# Patient Record
Sex: MALE | Race: WHITE
[De-identification: names, ages, dates, MRNs, and addresses within clinical notes are randomized per-mention and may not be internally consistent; named-entity substitution may affect disease eponyms.]

---

## 2020-10-22 ENCOUNTER — HOSPITAL ENCOUNTER (OUTPATIENT)
Dept: HOSPITAL 47 - LABPAT | Age: 50
Discharge: HOME | End: 2020-10-22
Attending: SURGERY
Payer: COMMERCIAL

## 2020-10-22 DIAGNOSIS — Z01.818: Primary | ICD-10-CM

## 2020-10-22 DIAGNOSIS — U07.1: ICD-10-CM

## 2020-10-29 ENCOUNTER — HOSPITAL ENCOUNTER (OUTPATIENT)
Dept: HOSPITAL 47 - ORWHC2ENDO | Age: 50
Discharge: HOME | End: 2020-10-29
Attending: SURGERY
Payer: COMMERCIAL

## 2020-10-29 VITALS — BODY MASS INDEX: 23.7 KG/M2

## 2020-10-29 VITALS — TEMPERATURE: 97.7 F

## 2020-10-29 VITALS — DIASTOLIC BLOOD PRESSURE: 71 MMHG | SYSTOLIC BLOOD PRESSURE: 106 MMHG

## 2020-10-29 VITALS — HEART RATE: 52 BPM | RESPIRATION RATE: 16 BRPM

## 2020-10-29 DIAGNOSIS — Z80.9: ICD-10-CM

## 2020-10-29 DIAGNOSIS — Z12.11: Primary | ICD-10-CM

## 2020-10-29 DIAGNOSIS — Z79.1: ICD-10-CM

## 2020-10-29 DIAGNOSIS — Z88.0: ICD-10-CM

## 2020-10-29 DIAGNOSIS — F17.210: ICD-10-CM

## 2020-10-29 DIAGNOSIS — F32.9: ICD-10-CM

## 2020-10-29 DIAGNOSIS — M19.90: ICD-10-CM

## 2020-10-29 DIAGNOSIS — D12.8: ICD-10-CM

## 2020-10-29 PROCEDURE — 88305 TISSUE EXAM BY PATHOLOGIST: CPT

## 2020-10-29 PROCEDURE — 45385 COLONOSCOPY W/LESION REMOVAL: CPT

## 2020-10-29 NOTE — P.OP
Date of Procedure: 10/29/20


Preoperative Diagnosis: 


screening


Postoperative Diagnosis: 


same





Procedure(s) Performed: 


Colonoscopy with hot snare polypectomy


Anesthesia: JASPER


Surgeon: Alex Warren


Estimated Blood Loss (ml): 0


Condition: stable


Disposition: observation


Description of Procedure: 


Patient is brought operative suite placed in the left lateral decubitus position

underwent sedation per department of anesthesia rectal exam was performed 

correct patient correct procedure correct site was verified.  Scope was passed 

from the rectum to the cecum with the slowly withdrawn the sure to visualize all

walls of the colon on the way out there was 3 polyps noted 2 large pedunculated 

polyps one at 10 cm in the rectum and one at 15 cm in the rectum these were both

removed via hot snare polypectomy and sent to pathology.  There was also a small

sessile polyp noted at 10 cm in the rectum this was also snared with a hot snare

and removed and sent to pathology.  Scope was retroflexed and no gross 

abnormalities were noted in the rectum scope was then withdrawn patient artery 

procedure well no apparent complications

## 2021-12-05 ENCOUNTER — HOSPITAL ENCOUNTER (OUTPATIENT)
Dept: HOSPITAL 47 - EC | Age: 51
Setting detail: OBSERVATION
Discharge: HOME | End: 2021-12-05
Attending: INTERNAL MEDICINE | Admitting: INTERNAL MEDICINE
Payer: COMMERCIAL

## 2021-12-05 VITALS — HEART RATE: 88 BPM | TEMPERATURE: 98.1 F | DIASTOLIC BLOOD PRESSURE: 68 MMHG | SYSTOLIC BLOOD PRESSURE: 108 MMHG

## 2021-12-05 VITALS — RESPIRATION RATE: 18 BRPM

## 2021-12-05 DIAGNOSIS — M65.9: ICD-10-CM

## 2021-12-05 DIAGNOSIS — Z88.0: ICD-10-CM

## 2021-12-05 DIAGNOSIS — F17.210: ICD-10-CM

## 2021-12-05 DIAGNOSIS — L03.012: ICD-10-CM

## 2021-12-05 DIAGNOSIS — Z80.7: ICD-10-CM

## 2021-12-05 DIAGNOSIS — A41.9: Primary | ICD-10-CM

## 2021-12-05 DIAGNOSIS — L02.512: ICD-10-CM

## 2021-12-05 DIAGNOSIS — M54.9: ICD-10-CM

## 2021-12-05 DIAGNOSIS — Z20.822: ICD-10-CM

## 2021-12-05 LAB
ALBUMIN SERPL-MCNC: 3.7 G/DL (ref 3.5–5)
ALP SERPL-CCNC: 75 U/L (ref 38–126)
ALT SERPL-CCNC: 16 U/L (ref 4–49)
ANION GAP SERPL CALC-SCNC: 9 MMOL/L
APTT BLD: 24.7 SEC (ref 22–30)
AST SERPL-CCNC: 26 U/L (ref 17–59)
BASOPHILS # BLD AUTO: 0 K/UL (ref 0–0.2)
BASOPHILS NFR BLD AUTO: 0 %
BUN SERPL-SCNC: 14 MG/DL (ref 9–20)
CALCIUM SPEC-MCNC: 8.8 MG/DL (ref 8.4–10.2)
CHLORIDE SERPL-SCNC: 102 MMOL/L (ref 98–107)
CO2 SERPL-SCNC: 24 MMOL/L (ref 22–30)
EOSINOPHIL # BLD AUTO: 0.1 K/UL (ref 0–0.7)
EOSINOPHIL NFR BLD AUTO: 1 %
ERYTHROCYTE [DISTWIDTH] IN BLOOD BY AUTOMATED COUNT: 4.1 M/UL (ref 4.3–5.9)
ERYTHROCYTE [DISTWIDTH] IN BLOOD: 13.9 % (ref 11.5–15.5)
GLUCOSE SERPL-MCNC: 169 MG/DL (ref 74–99)
HCT VFR BLD AUTO: 36.4 % (ref 39–53)
HGB BLD-MCNC: 12.5 GM/DL (ref 13–17.5)
INR PPP: 0.9 (ref ?–1.2)
LYMPHOCYTES # SPEC AUTO: 1.2 K/UL (ref 1–4.8)
LYMPHOCYTES NFR SPEC AUTO: 9 %
MAGNESIUM SPEC-SCNC: 2.4 MG/DL (ref 1.6–2.3)
MCH RBC QN AUTO: 30.4 PG (ref 25–35)
MCHC RBC AUTO-ENTMCNC: 34.2 G/DL (ref 31–37)
MCV RBC AUTO: 88.9 FL (ref 80–100)
MONOCYTES # BLD AUTO: 0.7 K/UL (ref 0–1)
MONOCYTES NFR BLD AUTO: 6 %
NEUTROPHILS # BLD AUTO: 10.8 K/UL (ref 1.3–7.7)
NEUTROPHILS NFR BLD AUTO: 84 %
PLATELET # BLD AUTO: 411 K/UL (ref 150–450)
POTASSIUM SERPL-SCNC: 4.2 MMOL/L (ref 3.5–5.1)
PROT SERPL-MCNC: 6.7 G/DL (ref 6.3–8.2)
PT BLD: 9.5 SEC (ref 9–12)
SODIUM SERPL-SCNC: 135 MMOL/L (ref 137–145)
WBC # BLD AUTO: 12.9 K/UL (ref 3.8–10.6)

## 2021-12-05 PROCEDURE — 87205 SMEAR GRAM STAIN: CPT

## 2021-12-05 PROCEDURE — 83605 ASSAY OF LACTIC ACID: CPT

## 2021-12-05 PROCEDURE — 11042 DBRDMT SUBQ TIS 1ST 20SQCM/<: CPT

## 2021-12-05 PROCEDURE — 83036 HEMOGLOBIN GLYCOSYLATED A1C: CPT

## 2021-12-05 PROCEDURE — 26055 INCISE FINGER TENDON SHEATH: CPT

## 2021-12-05 PROCEDURE — 83735 ASSAY OF MAGNESIUM: CPT

## 2021-12-05 PROCEDURE — 85730 THROMBOPLASTIN TIME PARTIAL: CPT

## 2021-12-05 PROCEDURE — 87077 CULTURE AEROBIC IDENTIFY: CPT

## 2021-12-05 PROCEDURE — 73130 X-RAY EXAM OF HAND: CPT

## 2021-12-05 PROCEDURE — 87070 CULTURE OTHR SPECIMN AEROBIC: CPT

## 2021-12-05 PROCEDURE — 26020 DRAIN HAND TENDON SHEATH: CPT

## 2021-12-05 PROCEDURE — 87075 CULTR BACTERIA EXCEPT BLOOD: CPT

## 2021-12-05 PROCEDURE — 85610 PROTHROMBIN TIME: CPT

## 2021-12-05 PROCEDURE — 13132 CMPLX RPR F/C/C/M/N/AX/G/H/F: CPT

## 2021-12-05 PROCEDURE — 96375 TX/PRO/DX INJ NEW DRUG ADDON: CPT

## 2021-12-05 PROCEDURE — 13133 CMPLX RPR F/C/C/M/N/AX/G/H/F: CPT

## 2021-12-05 PROCEDURE — 85025 COMPLETE CBC W/AUTO DIFF WBC: CPT

## 2021-12-05 PROCEDURE — 87635 SARS-COV-2 COVID-19 AMP PRB: CPT

## 2021-12-05 PROCEDURE — 87040 BLOOD CULTURE FOR BACTERIA: CPT

## 2021-12-05 PROCEDURE — 80053 COMPREHEN METABOLIC PANEL: CPT

## 2021-12-05 PROCEDURE — 99285 EMERGENCY DEPT VISIT HI MDM: CPT

## 2021-12-05 PROCEDURE — 84100 ASSAY OF PHOSPHORUS: CPT

## 2021-12-05 PROCEDURE — 87186 SC STD MICRODIL/AGAR DIL: CPT

## 2021-12-05 PROCEDURE — 96374 THER/PROPH/DIAG INJ IV PUSH: CPT

## 2021-12-05 NOTE — P.PN
Progress Note - Text


Progress Note Date: 12/05/21





Brief Post op:





Pt s/e post procedure 1430. No issues.  Still sleepy.  Denies pain.  Cap refill 

brisk.  Compartments soft.  ID consulted, IVABX on board.  Start hibiclens soaks

BID tomrrow.  Will follow.

## 2021-12-05 NOTE — P.CNOR
History of Present Illness





- HPI


Consult date: 12/05/21


Consult reason: other (infection)


History of present illness: 





50 yo male presented with c/o L hand pain with draining wound.  Pt states it has

been like this for about a week but over the past three days has gotten worse.  

He states pain in his ring finger with open abscess over the dorsal aspect as 

well as pain on the palmar side, pain with any motion and pain with palpation.  

He states swelling as well.  Denies any f/c/sob/cp at this time.  States no 

significant medical history.  He does smoke.  





Review of Systems





14 tp ROS completed and negative as per HPI.  All other systems reviewed are 

negative. 





Past Medical History


Past Medical History: No Reported History


Additional Past Medical History / Comment(s): back pain


History of Any Multi-Drug Resistant Organisms: None Reported


Past Surgical History: No Surgical Hx Reported


Additional Past Surgical History / Comment(s): wisdom teeth


Past Anesthesia/Blood Transfusion Reactions: No Reported Reaction


Past Psychological History: No Psychological Hx Reported


Smoking Status: Current every day smoker


Past Alcohol Use History: None Reported


Past Drug Use History: None Reported





- Past Family History


  ** Mother


Family Medical History: Cancer





Medications and Allergies


                                Home Medications











 Medication  Instructions  Recorded  Confirmed  Type


 


No Known Home Medications  12/05/21 12/05/21 History








                                    Allergies











Allergy/AdvReac Type Severity Reaction Status Date / Time


 


Penicillins Allergy  Unknown Verified 12/05/21 07:36





   Childhood  














Physical Examination


Osteopathic Statement: *.  No significant issues noted on an osteopathic 

structural exam other than those noted in the History and Physical/Consult.





AOX3 NAD





Lt hand:


Fusiform swelling about the Left hand and mostly ring finger.  There is erythema

and open abscess just proximal to the PIP jont of the Lt ring finger.  There is 

ttp of the extensor region as well as flexor region.  There is erythema.  There 

is pain with passive motion of the digit.  There is skin sloughing over the 

dorsum of the finger due to blistering from the swelling.  Pt is intact to 

pinprick and light touch M/R/U nerves at this time and is hypersensitive in this

RF.  He can move the finger with good motor strength but this greatly increases 

his pain.  +rad/ulnar pulses.  Compartments otherwise are soft.  





Results





xray of the Lt hand show swelling about the soft tissues.  No fracture or 

dislocation is noted.  





- Labs


Labs: 


                  Abnormal Lab Results - Last 24 Hours (Table)











  12/05/21 12/05/21 Range/Units





  06:57 06:57 


 


WBC  12.9 H   (3.8-10.6)  k/uL


 


RBC  4.10 L   (4.30-5.90)  m/uL


 


Hgb  12.5 L   (13.0-17.5)  gm/dL


 


Hct  36.4 L   (39.0-53.0)  %


 


Neutrophils #  10.8 H   (1.3-7.7)  k/uL


 


Sodium   135 L  (137-145)  mmol/L


 


Glucose   169 H  (74-99)  mg/dL


 


Magnesium   2.4 H  (1.6-2.3)  mg/dL








                                      H & H











  12/05/21 Range/Units





  06:57 


 


Hgb  12.5 L  (13.0-17.5)  gm/dL


 


Hct  36.4 L  (39.0-53.0)  %








                                   Coagulation











  12/05/21 Range/Units





  06:57 


 


INR  0.9  (<1.2)  











Result Diagrams: 


                                 12/05/21 06:57





                                 12/05/21 06:57





Assessment and Plan


Assessment: 





50 yo male with Lt ring finger flexor tenosynovitis with concurrent dorsal ring 

finger abscess


Plan: 





NPO


Consent confirmed


Plan for OR for I&D of Lt ring finger urgently 








I discussed the risks and benefits of surgery with the pt.  He understands and 

is willing to proceed with I&D of the Lt hand.

## 2021-12-05 NOTE — XR
EXAMINATION TYPE: XR hand complete LT

 

DATE OF EXAM: 12/5/2021

 

COMPARISON: NONE

 

HISTORY: Hand swelling

 

TECHNIQUE: 3 views

 

FINDINGS: Metacarpals are intact. The fingers appear intact. I see no fracture nor dislocation. Carpa
l bones appear normal. There is soft tissue swelling on the dorsum of the hand.

 

IMPRESSION: Soft tissue swelling. No fracture seen. No sign of inflammatory arthritis.

## 2021-12-05 NOTE — ED
Skin/Abscess/FB HPI





- General


Chief complaint: Skin/Abscess/Foreign Body


Stated complaint: Left hand swelling


Time Seen by Provider: 12/05/21 05:22


Source: patient, RN notes reviewed, old records reviewed


Mode of arrival: ambulatory


Limitations: no limitations





- History of Present Illness


Initial comments: 





This is a 51-year-old male DF for evaluation today.  Patient comes in DF for 

evaluation regarding significant swelling and pain left finger and hand.  

Swelling up arm.  Patient does feel fevers at times.  Denying drug or alcohol 

abuse denying significant medical problems denying any other complaints.


MD complaint: abscess/boil, discoloration, other (Severe left 4 finger, ring 

finger pain swelling and drainage)


-: week(s)


Tetanus Up to Date: no


Location: L hand


Severity: severe


Severity scale (1-10): 10


Quality: burning, aching, sharp, constant


Consistency: constant


Improves with: none


Worsens with: palpation, movement


Context: other (Thinks he may have been bit by something)


Associated symptoms: denies other symptoms


Treatments Prior to Arrival: attempted to drain pus at home





- Related Data


                                Home Medications











 Medication  Instructions  Recorded  Confirmed


 


Lexapro(Unknown) 1 tab PO DAILY 12/06/21 12/06/21








                                  Previous Rx's











 Medication  Instructions  Recorded


 


Ibuprofen [Motrin] 400 mg PO Q6HR PRN  tab 12/08/21


 


Linezolid [Zyvox] 600 mg PO Q12H #28 tab 12/08/21


 


Nicotine 21Mg/24Hr Patch [Habitrol] 1 patch TRANSDERM DAILY #30 patch 12/08/21


 


Pantoprazole [Protonix] 40 mg PO AC-BRKFST #30 tab 12/08/21











                                    Allergies











Allergy/AdvReac Type Severity Reaction Status Date / Time


 


Penicillins Allergy  Unknown Verified 12/06/21 06:48





   Childhood  














Review of Systems


ROS Statement: 


Those systems with pertinent positive or pertinent negative responses have been 

documented in the HPI.





ROS Other: All systems not noted in ROS Statement are negative.





Past Medical History


Past Medical History: No Reported History


Additional Past Medical History / Comment(s): back pain


History of Any Multi-Drug Resistant Organisms: None Reported


Past Surgical History: No Surgical Hx Reported


Additional Past Surgical History / Comment(s): wisdom teeth


Past Anesthesia/Blood Transfusion Reactions: No Reported Reaction


Past Psychological History: No Psychological Hx Reported


Smoking Status: Current every day smoker


Past Alcohol Use History: None Reported


Past Drug Use History: None Reported





- Past Family History


  ** Mother


Family Medical History: Cancer





General Exam


Limitations: no limitations


General appearance: alert, in no apparent distress, anxious


Head exam: Present: atraumatic, normocephalic, normal inspection


Eye exam: Present: normal appearance, PERRL, EOMI.  Absent: scleral icterus, 

conjunctival injection, periorbital swelling


ENT exam: Present: normal exam, mucous membranes moist


Neck exam: Present: normal inspection.  Absent: tenderness, meningismus, 

lymphadenopathy


Respiratory exam: Present: normal lung sounds bilaterally.  Absent: respiratory 

distress, wheezes, rales, rhonchi, stridor


Cardiovascular Exam: Present: normal rhythm, tachycardia, normal heart sounds.  

Absent: systolic murmur, diastolic murmur, rubs, gallop, clicks


GI/Abdominal exam: Present: soft, normal bowel sounds.  Absent: distended, 

tenderness, guarding, rebound, rigid


Extremities exam: Present: normal inspection, full ROM, normal capillary refill,

 other (left Ring finger does have significant swelling and erythema and 

drainage).  Absent: tenderness, pedal edema, joint swelling, calf tenderness


Back exam: Present: normal inspection


Neurological exam: Present: alert, oriented X3, CN II-XII intact


Psychiatric exam: Present: normal affect, normal mood


Skin exam: Present: warm, dry, intact, normal color.  Absent: rash





Course


                                   Vital Signs











  12/05/21





  05:04


 


Temperature 99.3 F


 


Pulse Rate 110 H


 


Respiratory 24





Rate 


 


Blood Pressure 103/58


 


O2 Sat by Pulse 96





Oximetry 














- Reevaluation(s)


Reevaluation #1: 





12/05/21 06:12


Medical record is reviewed


Reevaluation #2: 





12/05/21 06:12


Pain is controlled


Reevaluation #3: 





12/05/21 06:12


Patient informed results and questions answered





Patient will require orthopedic evaluation








- Consultations


Consultation #1: 





Spoke with committing doctor who agrees to admit this patient





Medical Decision Making





- Medical Decision Making





51 male to the emergency department with likely flexor tenosynovitis.  Patient 

has severe pain swelling increasing upper arm will admit for IV antibiotics and 

surgical evaluation





- Lab Data


Result diagrams: 


                                 12/05/21 06:57





                                 12/05/21 06:57





- Radiology Data


Radiology results: report reviewed (History of significant finger swelling), 

image reviewed





Critical Care Time


Critical Care Time: Yes


Total Critical Care Time: 31





Disposition


Clinical Impression: 


 Abscess of left ring finger, Cellulitis of left ring finger





Narrative: 





r/o Flexor Tenosynovitis


Disposition: ADMITTED AS IP TO THIS HOSP


Condition: Serious


Is patient prescribed a controlled substance at d/c from ED?: No

## 2021-12-05 NOTE — P.HPIM
History of Present Illness


H&P Date: 21





HISTORY OF PRESENT ILLNESS


This is a 51-year-old male patient of [facility] with past medical history 

of back to use and dependence.  Patient presented to the hospital with a 

draining wound on the left fourth finger.  He is a very poor historian, he is 

falling asleep while asking questions and does not answer questions completely. 

He is complaining of left hand pain.  He states it has been bothering him for 3 

days.  Unknown if he has had any fever or chills.  Unable to obtain 

injury/source of wound.  Patient was found to have a temperature of 99.3, heart 

rate 110, respiratory rate 24, blood pressure 103/58, pulse ox 96% on room air.


WBC 12.9, hemoglobin 12.5, platelet count 411.  INR 0.9.  Sodium 135 otherwise 

electrolytes and renal function normal.  Blood sugar 169.  Patient does deny 

history of diabetes.  Magnesium 2.4.  Liver function tests normal.  Albumin 3.7.

 Corona virus PCR not detected.


Left hand x-ray reveals soft tissue swelling.  No fracture.  No sign of 

inflammatory arthritis.


Patient is seen today in the emergency center waiting for a bed on the [facility] 

floor.  Patient has been seen by orthopedics with plan for I&D today





REVIEW OF SYSTEMS


Constitutional: No fever, no chills, no night sweats.  No weight change.  No 

weakness, fatigue or lethargy.  No daytime sleepiness.


EENT: No headache.  No blurred vision or double vision, no loss of vision.  No 

loss of Hearing, no ringing in the ears, no dizziness.  No nasal drainage or 

congestion.  No epistaxis.  No sore throat.


Lungs: No shortness of breath, cough, no sputum production.  No wheezing.


Cardiovascular: No chest pain, no lower extremity edema.  No palpitations.  No 

paroxysmal nocturnal dyspnea.  No orthopnea.  No lightheadedness or dizziness.  

No syncopal episodes.


Abdominal: No abdominal pain.  No nausea, vomiting.  No diarrhea.  No 

constipation.  No bloody or tarry stools.  No loss of appetite.


Genitourinary: No dysuria, increased frequency, urgency.  No urinary retention.


Musculoskeletal: No myalgias.  No muscle weakness, no gait dysfunction, no 

frequent falls.  No back pain.  No neck pain.


Integumentary: No wounds, no lesions.  No rash or pruritus.  No unusual 

bruising.  No change in hair or nails.


Neurologic: No aphasia. No facial droop. No change in mentation. No head injury.

No headache. No paralysis. No paresthesia.


Psychiatric: No depression.  No anxiety.  No mood swings.


Endocrine: No abnormal blood sugars.  No weight change.  No excessive sweating 

or thirst.  No cold intolerance.  








SOCIAL HISTORY


Patient is a smoker one pack per day since he was 14 years of age.  He denies 

any alcohol use, street drug use or marijuana use.  He is single.  Patient is 

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





FAMILY HISTORY


Mother  at age 37 from lymphoma.  Father  from a accident at work.  

Patient has one brother and 3 sisters with no major medical problems.  Patient 

is 2 daughters with no major medical problems.





PHYSICAL EXAMINATION


Gen: This is a 51-year-old male, resting in the ER stretcher, patient is sleepy 

and groggy.


HEENT: Head is atraumatic, normocephalic. Pupils equal, round. Sclerae is 

anicteric. 


NECK: Supple. No JVD. No lymphadenopathy. No thyromegaly. 


LUNGS: Clear to auscultation. No wheezes or rhonchi.  No intercostal 

retractions.


HEART: Regular rate and rhythm. No murmur. 


ABDOMEN: Soft. Bowel sounds are present. No masses.  No tenderness.


EXTREMITIES: No pedal edema.  No calf tenderness.  Abscess with serous drainage 

from the left fourth finger, significant edema to the hand wrist and into the di

stal forearm.


NEUROLOGICAL: Patient is groggy but awakens to answer questions and falls 

asleep. Cranial nerves 2 through 12 are grossly intact. 





ASSESSMENT AND PLAN


1.  Sepsis secondary to abscess left fourth finger.  Patient admitted to the 

[facility] floor, consult with orthopedics with plan for I&D urgently.  Continue 

ceftriaxone and vancomycin, obtain wound culture and blood culture.  Consult 

added for Dr. soto.  Continue Dilaudid, ibuprofen for pain





2.  Tobacco use and dependence.  Patient will be started on nicotine patch.





3.  GI prophylaxis.  Protonix 40 mg oral daily.





4.  DVT prophylaxis.  Heparin subcu.





5. COVID-19 testing negative.  Patient has been hospitalized during a pandemic.





Patient will be admitted to the hospital for a minimum of 2 night stay.





DISCHARGE PLAN


Home possible need for IV antibiotics .





Impression and plan of care have been directed as dictated by the signing 

physician.  Madai Maldonado nurse practitioner acting as scribe for signing 

physician.





Past Medical History


Past Medical History: No Reported History


Additional Past Medical History / Comment(s): back pain


History of Any Multi-Drug Resistant Organisms: None Reported


Past Surgical History: No Surgical Hx Reported


Additional Past Surgical History / Comment(s): wisdom teeth


Past Anesthesia/Blood Transfusion Reactions: No Reported Reaction


Past Psychological History: No Psychological Hx Reported


Smoking Status: Current every day smoker


Past Alcohol Use History: None Reported


Past Drug Use History: None Reported





- Past Family History


  ** Mother


Family Medical History: Cancer





Medications and Allergies


                                Home Medications











 Medication  Instructions  Recorded  Confirmed  Type


 


No Known Home Medications  21 History








                                    Allergies











Allergy/AdvReac Type Severity Reaction Status Date / Time


 


Penicillins Allergy  Unknown Verified 21 07:36





   Childhood  














Physical Exam


Vitals: 


                                   Vital Signs











  Temp Pulse Resp BP Pulse Ox


 


 21 05:04  99.3 F  110 H  24  103/58  96








                                Intake and Output











 21





 22:59 06:59 14:59


 


Other:   


 


  Weight  77.111 kg 














Results


CBC & Chem 7: 


                                 21 06:57





                                 21 06:57


Labs: 


                  Abnormal Lab Results - Last 24 Hours (Table)











  21 Range/Units





  06:57 06:57 


 


WBC  12.9 H   (3.8-10.6)  k/uL


 


RBC  4.10 L   (4.30-5.90)  m/uL


 


Hgb  12.5 L   (13.0-17.5)  gm/dL


 


Hct  36.4 L   (39.0-53.0)  %


 


Neutrophils #  10.8 H   (1.3-7.7)  k/uL


 


Sodium   135 L  (137-145)  mmol/L


 


Glucose   169 H  (74-99)  mg/dL


 


Magnesium   2.4 H  (1.6-2.3)  mg/dL

## 2021-12-06 ENCOUNTER — HOSPITAL ENCOUNTER (OUTPATIENT)
Dept: HOSPITAL 47 - EC | Age: 51
Setting detail: OBSERVATION
LOS: 2 days | End: 2021-12-08
Attending: INTERNAL MEDICINE | Admitting: INTERNAL MEDICINE
Payer: COMMERCIAL

## 2021-12-06 DIAGNOSIS — A41.02: Primary | ICD-10-CM

## 2021-12-06 DIAGNOSIS — B95.62: ICD-10-CM

## 2021-12-06 DIAGNOSIS — M54.9: ICD-10-CM

## 2021-12-06 DIAGNOSIS — L02.512: ICD-10-CM

## 2021-12-06 DIAGNOSIS — Z20.822: ICD-10-CM

## 2021-12-06 DIAGNOSIS — L03.012: ICD-10-CM

## 2021-12-06 DIAGNOSIS — Z88.0: ICD-10-CM

## 2021-12-06 DIAGNOSIS — F17.210: ICD-10-CM

## 2021-12-06 DIAGNOSIS — M65.9: ICD-10-CM

## 2021-12-06 DIAGNOSIS — Z98.890: ICD-10-CM

## 2021-12-06 DIAGNOSIS — Z80.7: ICD-10-CM

## 2021-12-06 PROCEDURE — 85652 RBC SED RATE AUTOMATED: CPT

## 2021-12-06 PROCEDURE — 96375 TX/PRO/DX INJ NEW DRUG ADDON: CPT

## 2021-12-06 PROCEDURE — 96365 THER/PROPH/DIAG IV INF INIT: CPT

## 2021-12-06 PROCEDURE — 99285 EMERGENCY DEPT VISIT HI MDM: CPT

## 2021-12-06 PROCEDURE — 80053 COMPREHEN METABOLIC PANEL: CPT

## 2021-12-06 PROCEDURE — 96366 THER/PROPH/DIAG IV INF ADDON: CPT

## 2021-12-06 PROCEDURE — 96367 TX/PROPH/DG ADDL SEQ IV INF: CPT

## 2021-12-06 PROCEDURE — 99284 EMERGENCY DEPT VISIT MOD MDM: CPT

## 2021-12-06 PROCEDURE — 85025 COMPLETE CBC W/AUTO DIFF WBC: CPT

## 2021-12-06 PROCEDURE — 82565 ASSAY OF CREATININE: CPT

## 2021-12-06 PROCEDURE — 96376 TX/PRO/DX INJ SAME DRUG ADON: CPT

## 2021-12-06 PROCEDURE — 87635 SARS-COV-2 COVID-19 AMP PRB: CPT

## 2021-12-06 PROCEDURE — 80202 ASSAY OF VANCOMYCIN: CPT

## 2021-12-06 PROCEDURE — 86140 C-REACTIVE PROTEIN: CPT

## 2021-12-06 PROCEDURE — 84100 ASSAY OF PHOSPHORUS: CPT

## 2021-12-06 PROCEDURE — 83735 ASSAY OF MAGNESIUM: CPT

## 2021-12-06 PROCEDURE — 96361 HYDRATE IV INFUSION ADD-ON: CPT

## 2021-12-06 RX ADMIN — HYDROMORPHONE HYDROCHLORIDE PRN MG: 1 INJECTION, SOLUTION INTRAMUSCULAR; INTRAVENOUS; SUBCUTANEOUS at 14:30

## 2021-12-06 RX ADMIN — HYDROMORPHONE HYDROCHLORIDE PRN MG: 1 INJECTION, SOLUTION INTRAMUSCULAR; INTRAVENOUS; SUBCUTANEOUS at 10:31

## 2021-12-06 RX ADMIN — HYDROCODONE BITARTRATE AND ACETAMINOPHEN PRN EACH: 5; 325 TABLET ORAL at 20:24

## 2021-12-06 RX ADMIN — HEPARIN SODIUM SCH: 5000 INJECTION INTRAVENOUS; SUBCUTANEOUS at 20:27

## 2021-12-06 RX ADMIN — CEFAZOLIN SCH MLS/HR: 330 INJECTION, POWDER, FOR SOLUTION INTRAMUSCULAR; INTRAVENOUS at 02:23

## 2021-12-06 RX ADMIN — CEFAZOLIN SCH MLS/HR: 330 INJECTION, POWDER, FOR SOLUTION INTRAMUSCULAR; INTRAVENOUS at 15:13

## 2021-12-06 RX ADMIN — CEFAZOLIN SCH: 330 INJECTION, POWDER, FOR SOLUTION INTRAMUSCULAR; INTRAVENOUS at 10:35

## 2021-12-06 RX ADMIN — NICOTINE SCH: 21 PATCH, EXTENDED RELEASE TRANSDERMAL at 14:27

## 2021-12-06 RX ADMIN — HYDROMORPHONE HYDROCHLORIDE PRN MG: 1 INJECTION, SOLUTION INTRAMUSCULAR; INTRAVENOUS; SUBCUTANEOUS at 18:04

## 2021-12-06 NOTE — P.HPIM
History of Present Illness


H&P Date: 21





HISTORY OF PRESENT ILLNESS


This is a 51-year-old male patient of [facility] with past medical history 

of back to use and dependence.  Patient presented to the hospital with a 

draining wound on the left fourth finger.  Patient was initially admitted on 

. He was a very poor historian, and again will not give any details about 

the injury.  Patient was seen at that time by Dr. Gonzalez and underwent I&D 

of the left dorsal hand and I&D of the left ring finger flexor sheath, left ring

finger A1 pulley release for flexor tenosynovitis of the left ring finger.  

Ulcers were obtained during surgery.  [place] police department contacted the

hospital regarding need to take suspect and arrangements were made for him to be

quickly discharged.  Patient now returns due to ongoing infection and need for 

antibiotics.  Patient has significantly less swelling and redness to the left 

hand and finger, sutures in place to the left fourth digit.  He is seen today in

the emergency center waiting for a [facility] bed.  Consults in place for 

orthopedics and infectious disease.  At the time of discharge, please officers 

planning for patient being arrested and we are hoping that cultures will be back

by tomorrow.








REVIEW OF SYSTEMS


Constitutional: No fever, no chills, no night sweats.  No weight change.  No 

weakness, fatigue or lethargy.  No daytime sleepiness.


EENT: No headache.  No blurred vision or double vision, no loss of vision.  No 

loss of Hearing, no ringing in the ears, no dizziness.  No nasal drainage or 

congestion.  No epistaxis.  No sore throat.


Lungs: No shortness of breath, cough, no sputum production.  No wheezing.


Cardiovascular: No chest pain, no lower extremity edema.  No palpitations.  No 

paroxysmal nocturnal dyspnea.  No orthopnea.  No lightheadedness or dizziness.  

No syncopal episodes.


Abdominal: No abdominal pain.  No nausea, vomiting.  No diarrhea.  No 

constipation.  No bloody or tarry stools.  No loss of appetite.


Genitourinary: No dysuria, increased frequency, urgency.  No urinary retention.


Musculoskeletal: No myalgias.  No muscle weakness, no gait dysfunction, no 

frequent falls.  No back pain.  No neck pain.


Integumentary: Noted wounds Left 4th finger, no lesions.  No rash or pruritus.  

No unusual bruising.  No change in hair or nails.


Neurologic: No aphasia. No facial droop. No change in mentation. No head injury.

No headache. No paralysis. No paresthesia.


Psychiatric: No depression.  No anxiety.  No mood swings.


Endocrine: No abnormal blood sugars.  No weight change.  No excessive sweating 

or thirst.  No cold intolerance.  





SOCIAL HISTORY


Patient is a smoker one pack per day since he was 14 years of age.  He denies 

any alcohol use, street drug use or marijuana use.  He is single.  Patient is 

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





FAMILY HISTORY


Mother  at age 37 from lymphoma.  Father  from a accident at work.  

Patient has one brother and 3 sisters with no major medical problems.  Patient 

is 2 daughters with no major medical problems.





PHYSICAL EXAMINATION


Gen: This is a 51-year-old male, resting in the ER stretcher, patient is in no 

acute distress.   at bedside


HEENT: Head is atraumatic, normocephalic. Pupils equal, round. Sclerae is 

anicteric. 


NECK: Supple. No JVD. No lymphadenopathy. No thyromegaly. 


LUNGS: Clear to auscultation. No wheezes or rhonchi.  No intercostal 

retractions.


HEART: Regular rate and rhythm. No murmur. 


ABDOMEN: Soft. Bowel sounds are present. No masses.  No tenderness.


EXTREMITIES: No pedal edema.  No calf tenderness.  Dressing in place to the left

hand and forearm.  Sutures in place to the left fourth digit, edema and redness 

improved from yesterday.  to the hand wrist and into the distal forearm.


NEUROLOGICAL: Patient is awake and gives "I don't know" to all questions.  No 

neural deficits. Cranial nerves 2 through 12 are grossly intact. 





ASSESSMENT AND PLAN


1.  Sepsis secondary to tenosynovitis left fourth finger status post I&D.  

Patient admitted to the [facility] floor, consult with orthopedics and infectious 

disease.  Continue ceftriaxone and vancomycin, wound culture and blood culture 

in process.  Continue Dilaudid, ibuprofen for pain.  Obtain CRP and sed rate.  

Repeat blood work ordered for tomorrow





2.  Tobacco use and dependence.  Patient will be started on nicotine patch.





3.  GI prophylaxis.  Protonix 40 mg oral daily.





4.  DVT prophylaxis.  Heparin subcu.





5. COVID-19 testing negative.  Patient has been hospitalized during a pandemic.





Patient will be admitted to the hospital for a minimum of 2 night stay.





DISCHARGE PLAN


Return to half-way in the next 24 hours.





Impression and plan of care have been directed as dictated by the signing 

physician.  Madai Maldonado nurse practitioner acting as scribe for signing 

physician.





Past Medical History


Past Medical History: No Reported History


Additional Past Medical History / Comment(s): back pain , cellulitis


History of Any Multi-Drug Resistant Organisms: None Reported


Past Surgical History: No Surgical Hx Reported


Additional Past Surgical History / Comment(s): wisdom teeth, I&D


Past Anesthesia/Blood Transfusion Reactions: No Reported Reaction


Past Psychological History: No Psychological Hx Reported


Smoking Status: Current every day smoker


Past Alcohol Use History: None Reported


Past Drug Use History: None Reported





- Past Family History


  ** Mother


Family Medical History: Cancer





  ** Father


Family Medical History: No Reported History





Medications and Allergies


                                Home Medications











 Medication  Instructions  Recorded  Confirmed  Type


 


Lexapro(Unknown) 1 tab PO DAILY 21 History








                                    Allergies











Allergy/AdvReac Type Severity Reaction Status Date / Time


 


Penicillins Allergy  Unknown Verified 21 06:48





   Childhood  














Physical Exam


Vitals: 


                                   Vital Signs











  Temp Pulse Resp BP Pulse Ox


 


 21 01:34  98.6 F  68  16  102/65  99








                                Intake and Output











 21





 22:59 06:59 14:59


 


Other:   


 


  Weight  77.111 kg

## 2021-12-06 NOTE — ED
Recheck HPI





- General


Chief Complaint: Extremity Injury, Upper


Stated Complaint: Hand Infection


Time Seen by Provider: 12/06/21 01:33


Source: patient, police, RN notes reviewed, old records reviewed


Mode of arrival: wheelchair


Limitations: no limitations





- History of Present Illness


Initial Comments: 





This is a 51-year-old male to the ER today for evaluation.  Patient is brought 

in as a transfer from an outside hospital for evaluation regards to repeat 

evaluation regarding postop surgery and flexor tenosynovitis.  Patient's 

presenting for assisted clearance.


MD Complaint: wound re-check, needs IV antibiotics


-: days(s)


Returns Today for: Called Because of Abnormal Lab/Test, needs IV antibiotics, 

persistent/worsening pain related to initial visit


Symptoms Since Prior Visit: worsening pain, worsening swelling, worsening 

redness, fever


Associated Symptoms: none


Treatments Prior to Arrival: Given Antibiotics on, Given Pain Meds on





- Related Data


                                Home Medications











 Medication  Instructions  Recorded  Confirmed


 


Lexapro(Unknown) 1 tab PO DAILY 12/06/21 12/06/21








                                  Previous Rx's











 Medication  Instructions  Recorded


 


Ibuprofen [Motrin] 400 mg PO Q6HR PRN  tab 12/08/21


 


Linezolid [Zyvox] 600 mg PO Q12H #28 tab 12/08/21


 


Nicotine 21Mg/24Hr Patch [Habitrol] 1 patch TRANSDERM DAILY #30 patch 12/08/21


 


Pantoprazole [Protonix] 40 mg PO AC-BRKFST #30 tab 12/08/21











                                    Allergies











Allergy/AdvReac Type Severity Reaction Status Date / Time


 


Penicillins Allergy  Unknown Verified 12/06/21 06:48





   Childhood  














Review of Systems


ROS Statement: 


Those systems with pertinent positive or pertinent negative responses have been 

documented in the HPI.





ROS Other: All systems not noted in ROS Statement are negative.





Past Medical History


Past Medical History: No Reported History


Additional Past Medical History / Comment(s): back pain , cellulitis


History of Any Multi-Drug Resistant Organisms: None Reported


Past Surgical History: No Surgical Hx Reported


Additional Past Surgical History / Comment(s): wisdom teeth, I&D


Past Anesthesia/Blood Transfusion Reactions: No Reported Reaction


Past Psychological History: No Psychological Hx Reported


Smoking Status: Current every day smoker


Past Alcohol Use History: None Reported


Past Drug Use History: None Reported





- Past Family History


  ** Mother


Family Medical History: Cancer





  ** Father


Family Medical History: No Reported History





General Exam


Limitations: no limitations


General appearance: alert, in no apparent distress


Head exam: Present: atraumatic, normocephalic, normal inspection


Eye exam: Present: normal appearance, PERRL, EOMI.  Absent: scleral icterus, 

conjunctival injection, periorbital swelling


ENT exam: Present: normal exam, mucous membranes moist


Neck exam: Present: normal inspection.  Absent: tenderness, meningismus, 

lymphadenopathy


Respiratory exam: Present: normal lung sounds bilaterally.  Absent: respiratory 

distress, wheezes, rales, rhonchi, stridor


Cardiovascular Exam: Present: regular rate, normal rhythm, normal heart sounds. 

 Absent: systolic murmur, diastolic murmur, rubs, gallop, clicks


GI/Abdominal exam: Present: soft, normal bowel sounds.  Absent: distended, 

tenderness, guarding, rebound, rigid


Extremities exam: Present: normal inspection, full ROM, normal capillary refill,

 other (Finger still remains significantly swollen edematous streaking up arm). 

 Absent: tenderness, pedal edema, joint swelling, calf tenderness


Back exam: Present: normal inspection


Neurological exam: Present: alert, oriented X3, CN II-XII intact


Psychiatric exam: Present: normal affect, normal mood


Skin exam: Present: warm, dry, intact, normal color.  Absent: rash





Course


                                   Vital Signs











  12/06/21 12/06/21 12/06/21





  01:34 08:00 15:01


 


Temperature 98.6 F 97.5 F L 98 F


 


Pulse Rate 68  


 


Pulse Rate [  69 82





Pulse Oximetery   





]   


 


Respiratory 16 16 16





Rate   


 


Blood Pressure 102/65  


 


Blood Pressure  104/62 





[Left Arm]   


 


Blood Pressure   112/76





[Right Arm]   


 


O2 Sat by Pulse 99 97 99





Oximetry   














- Reevaluation(s)


Reevaluation #1: 





Medical record is reviewed





Patient symptoms are significantly improved here in the ER





Patient informed results and questions answered





Medical Decision Making





- Medical Decision Making





51 male DF for evaluation for assisted clearance regarding recent Floxin she hasn't 

Y to surgery, patient will be admitted for antibiotics and clearance for assisted to

be done by either surgeon or infectious disease





- Lab Data


Result diagrams: 


                                 12/07/21 09:10





                                 12/08/21 06:21





Disposition


Clinical Impression: 


 Postoperative pain, Flexor tenosynovitis of finger, Abscess of left ring 

finger, Cellulitis of left ring finger





Narrative: 





Needs assisted CLearance


Disposition: ADMITTED AS IP TO THIS HOSP


Condition: Stable


Is patient prescribed a controlled substance at d/c from ED?: No

## 2021-12-06 NOTE — P.OP
Date of Procedure: 12/05/21


Preoperative Diagnosis: 


1. Dorsal hand and finger abscess





2. Flexor tenosynovitis Lt ring finger


Postoperative Diagnosis: 


1. Lt dorsal hand and ring finger abscess





2. Lt ring finger flexor tenosynovitis


Procedure(s) Performed: 


1. Incision and drainage Lt dorsal hand





2. Incision and drainage Lt Ring finger Flexor sheath





3. Irrigation and debidement of Lt dorsal hand and ring finger with flexor 

sheath washout and debridement





4. Lt ring finger A1 pulley release





5. Complex wound closure Lt hand and ring finger Dorsal 6 cm and volar 9 cm = 15

cm


Implants: 


none


Anesthesia: MAC


Surgeon: Edd Gonzalez


Assistant #1: Castro oS (Was present and necessary due to the complextiy of 

the case)


Estimated Blood Loss (ml): 25


IV fluids (ml): 400


Urine output (ml): 0


Pathology: other (x2 dorsal and volar abscess Lt hand and RF)


Disposition: PACU


Indications for Procedure: 


52 yo male presented to the ED with c/o Lt hand swelling, drainage, pain and 

inability to move his fingers well.  He states it has been going on for about a 

week but over the past three days he states it has become increasingly worse to 

the point where he is in intense pain with any motion or touch of the hand and 

finger.  He has pain with palpation dorsally and volarly.  Over the dorsal 

aspect he has a draining sinus.  He has pain with passive motion of the Lt ring 

finger, fusiform swelling and erythema.  We discussed that this is a surgical 

emergency and he neeeds to have this drained and he understands.  I discussed 

risks and benefits of the procedure with him and that we will open him up dorsl 

and volar due to the extent of the abscess and his sx.  He agrees.  He is will 

to assume the risks of surgery and is willing to proceed with I^D of the hand 

and finger.  


Description of Procedure: 


The patient was seen and examined in the preoperative area.  All preoperative 

protocols were followed.  Informed consent was obtained risks and benefits of 

the procedure were discussed at length.  Risks including bleeding infection 

damage to the surrounding tissue and risk of reoperation were discussed with the

patient.  Risk of anesthesia up to and including death was a discussed with the 

patient.  These are outlined in the risk reviewed.  They were willing to accept 

these risks and all of the risks of surgery.  The patient was given a weight-

based dose of antibiotics in the form of vancomycin-based dose.  The patient was

seen and evaluated by the anesthesia team who deemed them fit for surgery. The 

site was marked, the patient was willing to proceed with the procedure. 





 The patient was transferred to the operative suite by the Department of 

anesthesia.  There were then drifted off to sleep by the department of 

anesthesia and LMA anesthesia was used.  Once adequate anesthesia had been 

obtained the patient was carefully transferred to the operative bed.  All bony 

prominences were padded accordingly.  SCDs were placed on the nonoperative lower

extremities.  Arms were well padded.  





Left arm was exposed and placed on an arm board and well-padded





Preoperative briefing was done with the operative team and everyone was ready 

for the procedure to start.  The patients left arm and hand was then prepped 

and draped in the normal sterile fashion.  Timeout was then performed and all 

parties in agreement with the procedure to be performed. 





Starting dorsally we made a midline incision over the dorsal aspect of the 

patient's ring finger over the MCP joint to the PIP joint and just past this due

to the abscess being over the proximal phalanx.  Dissection taken down bluntly 

until a large area of phlegmon was encountered this was removed with a rongeur. 

We then curetted and cleaned the rest of the area until the extensor tendon was 

visualized.  It was still intact and the finger taken through a range of motion 

and the extensor tendon was still mobile.  We then proceeded with excision of 

necrotic material dorsally including skin and soft tissue phlegmon.  The abscess

tracked around the ulnar side of the proximal phalanx and tracked volarly in 

this area we cleaned pus from this area as well as phlegmon.  We copiously 

irrigated this wound and placed a sponge in it we then flipped the patient's 

hand over and performed Brunner's incisions over the ring finger on the left 

starting from the MCP joint to the DIP joint.  Blunt dissection was then taken 

down to the flexor sheath which was visualized.  There was purulence coming from

underneath the A2 pulley.  Distally we accessed the flexor sheath and then 

proximally performed an A1 pulley release to access the sheath proximally.  We 

then passed an Angiocath and copiously irrigated the flexor sheath in this way. 

This flushed the flexor sheath of any purulent material.  We then copiously 

irrigated the wound on the flexor side followed by the wound on the extensor 

side and removed any more necrotic tissue that could be seen with a rongeur.  We

curetted the areas as well to further cleaned.  The finger was then taken 

through a range of motion and flexor and extensor tendons are completely intact.

 We cleaned out the tracking of the abscess around the ulnar side of the digit 

is well.  The tourniquet was then released and the finger had good capillary 

refill we then cauterized any overt bleeding within the skin.  We then performed

complex wound closure of about 15 cm total using nylon suture.  We first started

on the volar surface and tacked on the corners of the Brunner's incision 

followed by simple stitch throughout the wound edges approximated very well.  On

the dorsal side we performed excision of the abscess tracked which left somewhat

of a void however the skin could easily be approximated and so this was 

performed with simple stitch.  The hand was then cleaned and dressed sterilely 

with Adaptic 4 x 4's and Molina roll this was then overwrapped with an Ace wrap.





 


The patient was then transferred back to their hospital bed.  There were 

awakened by department of anesthesia having tolerated the procedure very well 

with no complications.  The patient was then transported to the postoperative 

care unit in stable condition.

## 2021-12-07 LAB
ALBUMIN SERPL-MCNC: 2.9 G/DL (ref 3.5–5)
ALBUMIN/GLOB SERPL: 1.1 {RATIO}
ALP SERPL-CCNC: 50 U/L (ref 38–126)
ALT SERPL-CCNC: 16 U/L (ref 4–49)
ANION GAP SERPL CALC-SCNC: 4 MMOL/L
AST SERPL-CCNC: 19 U/L (ref 17–59)
BASOPHILS # BLD AUTO: 0 K/UL (ref 0–0.2)
BASOPHILS NFR BLD AUTO: 1 %
BUN SERPL-SCNC: 6 MG/DL (ref 9–20)
CALCIUM SPEC-MCNC: 8.5 MG/DL (ref 8.4–10.2)
CHLORIDE SERPL-SCNC: 109 MMOL/L (ref 98–107)
CO2 SERPL-SCNC: 25 MMOL/L (ref 22–30)
EOSINOPHIL # BLD AUTO: 0.5 K/UL (ref 0–0.7)
EOSINOPHIL NFR BLD AUTO: 8 %
ERYTHROCYTE [DISTWIDTH] IN BLOOD BY AUTOMATED COUNT: 3.78 M/UL (ref 4.3–5.9)
ERYTHROCYTE [DISTWIDTH] IN BLOOD: 14.5 % (ref 11.5–15.5)
GLOBULIN SER CALC-MCNC: 2.7 G/DL
GLUCOSE SERPL-MCNC: 99 MG/DL (ref 74–99)
HCT VFR BLD AUTO: 34.7 % (ref 39–53)
HGB BLD-MCNC: 11.7 GM/DL (ref 13–17.5)
LYMPHOCYTES # SPEC AUTO: 1.8 K/UL (ref 1–4.8)
LYMPHOCYTES NFR SPEC AUTO: 27 %
MAGNESIUM SPEC-SCNC: 2.1 MG/DL (ref 1.6–2.3)
MCH RBC QN AUTO: 31.1 PG (ref 25–35)
MCHC RBC AUTO-ENTMCNC: 33.8 G/DL (ref 31–37)
MCV RBC AUTO: 92 FL (ref 80–100)
MONOCYTES # BLD AUTO: 0.3 K/UL (ref 0–1)
MONOCYTES NFR BLD AUTO: 4 %
NEUTROPHILS # BLD AUTO: 3.8 K/UL (ref 1.3–7.7)
NEUTROPHILS NFR BLD AUTO: 58 %
PLATELET # BLD AUTO: 504 K/UL (ref 150–450)
POTASSIUM SERPL-SCNC: 4.3 MMOL/L (ref 3.5–5.1)
PROT SERPL-MCNC: 5.6 G/DL (ref 6.3–8.2)
SODIUM SERPL-SCNC: 138 MMOL/L (ref 137–145)
WBC # BLD AUTO: 6.6 K/UL (ref 3.8–10.6)

## 2021-12-07 RX ADMIN — HYDROMORPHONE HYDROCHLORIDE PRN MG: 1 INJECTION, SOLUTION INTRAMUSCULAR; INTRAVENOUS; SUBCUTANEOUS at 11:42

## 2021-12-07 RX ADMIN — SODIUM CHLORIDE SCH MLS/HR: 9 INJECTION, SOLUTION INTRAVENOUS at 20:15

## 2021-12-07 RX ADMIN — NICOTINE SCH: 21 PATCH, EXTENDED RELEASE TRANSDERMAL at 09:59

## 2021-12-07 RX ADMIN — HYDROCODONE BITARTRATE AND ACETAMINOPHEN PRN EACH: 5; 325 TABLET ORAL at 13:26

## 2021-12-07 RX ADMIN — PANTOPRAZOLE SODIUM SCH MG: 40 TABLET, DELAYED RELEASE ORAL at 09:59

## 2021-12-07 RX ADMIN — CEFAZOLIN SCH: 330 INJECTION, POWDER, FOR SOLUTION INTRAMUSCULAR; INTRAVENOUS at 03:51

## 2021-12-07 RX ADMIN — HYDROMORPHONE HYDROCHLORIDE PRN MG: 1 INJECTION, SOLUTION INTRAMUSCULAR; INTRAVENOUS; SUBCUTANEOUS at 01:45

## 2021-12-07 RX ADMIN — HYDROMORPHONE HYDROCHLORIDE PRN MG: 1 INJECTION, SOLUTION INTRAMUSCULAR; INTRAVENOUS; SUBCUTANEOUS at 07:52

## 2021-12-07 RX ADMIN — CEFAZOLIN SCH MLS/HR: 330 INJECTION, POWDER, FOR SOLUTION INTRAMUSCULAR; INTRAVENOUS at 23:00

## 2021-12-07 RX ADMIN — HEPARIN SODIUM SCH: 5000 INJECTION INTRAVENOUS; SUBCUTANEOUS at 20:20

## 2021-12-07 RX ADMIN — CEFAZOLIN SCH: 330 INJECTION, POWDER, FOR SOLUTION INTRAMUSCULAR; INTRAVENOUS at 20:20

## 2021-12-07 RX ADMIN — HYDROMORPHONE HYDROCHLORIDE PRN MG: 1 INJECTION, SOLUTION INTRAMUSCULAR; INTRAVENOUS; SUBCUTANEOUS at 20:16

## 2021-12-07 RX ADMIN — SODIUM CHLORIDE SCH MLS/HR: 9 INJECTION, SOLUTION INTRAVENOUS at 11:52

## 2021-12-07 RX ADMIN — HEPARIN SODIUM SCH: 5000 INJECTION INTRAVENOUS; SUBCUTANEOUS at 09:45

## 2021-12-07 RX ADMIN — HYDROMORPHONE HYDROCHLORIDE PRN MG: 1 INJECTION, SOLUTION INTRAMUSCULAR; INTRAVENOUS; SUBCUTANEOUS at 17:19

## 2021-12-07 RX ADMIN — CEFAZOLIN SCH MLS/HR: 330 INJECTION, POWDER, FOR SOLUTION INTRAMUSCULAR; INTRAVENOUS at 20:20

## 2021-12-07 NOTE — P.CONS
History of Present Illness





- Reason for Consult


Consult date: 21


left 4th finger infection


Requesting physician: Vin Davalos





- Chief Complaint


left 4th finger pain and redness x days





- History of Present Illness


History of present illness : Patient is 51-year-old  male presenting to

the hospital for a left fourth finger infection, and this patient who was 

admitted to the hospital initially with a left fourth finger infection that 

started few days before presentation to the hospital patient mention started is 

a small area of irritation of the scab that has gradually increased in size 

becoming more swollen red and painful patient described the pain to be more of a

throbbing nature almost 10 out of 10 with some relief with the pain medication 

patient was taken to the OR on 2021 with evidence of left fourth finger abs

cess drainage with subsequent complex wound closure cultures were obtained 

patient subsequently was arrested by [employer] and the 

patient was cleared for discharge and subsequent has been brought back by the 

 department for his left fourth finger infection patient be treated with 

a vancomycin and Rocephin infectious he was consulted for further management of 

antibiotic therapy patient on presentation to the hospital has been afebrile he 

did have a sed rate of 47 CRP of 7.1 but no CBC or BMP has been done cultures 

from the surgery on the fifth are showing presumptive MRSA














Review of system:


 CONSTITUTIONAL:  Positive for weakness denies high-grade fever.


EYES:  No complaint.


ENT:  No complaint.


RESPIRATORY:  No complaint.


CARDIOVASCULAR:  No complaint.


GENITOURINARY:  No complaint.


GASTROINTESTINAL:  No complaint.


MUSCULOSKELETAL: As per history of present illness.


INTEGUMENTARY: No complaint.


PSYCHOLOGIC: No complaint.


ENDOCRINE: No complaint.


NEUROLOGIC:  No complaint.








Past medical history : Reviewed, documented below


Past surgical history : Reviewed, documented below


Social history: Reviewed, documented below


Medications: Reviewed, as documented below








EXAMINATION:


Vital sigans=  Reviewed and documented below


GENERAL DESCRIPTION: Middle-aged male lying in bed, no distress. No tachypnea or

accessory muscle of respiration use.


HEENT: Shows Pallor , no scleral icterus. Oral mucous membrane is dry.


NECK: Trachea central, no thyromegaly.


LUNGS: Unlabored breathing. Clear to auscultation anteriorly. No wheeze or 

crackle.


HEART: S1, S2, regular rate and rhythm.


ABDOMEN: Soft, no tenderness , guarding or rigidity


EXTREMITIES: Left fourth finger dorsum incision is currently staged he did have 

a swelling and redness minimal drainage no foul-smelling.


SKIN: No rash, no masses palpable.


NEUROLOGICAL: The patient is awake, alert, oriented x3, mood and affect normal.





LABS AND RADIOLOGY: Reviewed results see below





Assessment : Patient with left fourth finger extensive infection and an abscess 

which was extended down to the fascia status post drainage on 2021 and the 

cultures are showing presumptive MRSA, patient did have extensive cellulitis and

will need to be on IV antibiotics to decrease the intensity of infection and 

prevent finger loss








Plan: 1-vancomycin pharmacy to dose with a target trough of 15 while watching 

kidney function and Vanco trough closely.


2-discontinue Rocephin


3-local wound care per surgical team


We will follow on clinical condition and cultures to further adjust medication 

if needed


Thank you for this consultation we will follow the patient along with you








Past Medical History


Past Medical History: No Reported History


Additional Past Medical History / Comment(s): back pain , cellulitis


History of Any Multi-Drug Resistant Organisms: None Reported


Past Surgical History: No Surgical Hx Reported


Additional Past Surgical History / Comment(s): wisdom teeth, I&D


Past Anesthesia/Blood Transfusion Reactions: No Reported Reaction


Past Psychological History: No Psychological Hx Reported


Smoking Status: Current every day smoker


Past Alcohol Use History: None Reported


Past Drug Use History: None Reported





- Past Family History


  ** Mother


Family Medical History: Cancer


Additional Family Medical History / Comment(s): Mother had cancer in her lymph 

nodes/she is .





  ** Father


Family Medical History: No Reported History





Medications and Allergies


                                Home Medications











 Medication  Instructions  Recorded  Confirmed  Type


 


Lexapro(Unknown) 1 tab PO DAILY 21 History








                                    Allergies











Allergy/AdvReac Type Severity Reaction Status Date / Time


 


Penicillins Allergy  Unknown Verified 21 06:48





   Childhood  














Physical Exam


Vitals: 


                                   Vital Signs











  Temp Pulse Pulse Resp BP BP BP


 


 21 19:22  97.5 F L   62  17    94/57


 


 21 15:01  98 F   82  16    112/76


 


 21 08:00  97.5 F L   69  16   104/62 


 


 21 01:34  98.6 F  68   16  102/65  














  Pulse Ox


 


 21 19:22  97


 


 21 15:01  99


 


 21 08:00  97


 


 21 01:34  99








                                Intake and Output











 21





 14:59 22:59 06:59


 


Intake Total 1040 240 


 


Balance 1040 240 


 


Intake:   


 


  Intake, IV Titration 800  





  Amount   


 


    Sodium Chloride 0.9% 1, 500  





    000 ml @ 130 mls/hr IV .   





    Q7H42M [facility] Rx#:[id number]   


 


    Vancomycin 1,500 mg In 250  





    Sodium Chloride 0.9% 250   





    ml @ 125 mls/hr IVPB Q12H   





    [facility] Rx#:[id number]   


 


    cefTRIAXone 1 gm In 50  





    Sodium Chloride 0.9% 50   





    ml @ 100 mls/hr IVPB   





    Q24HR [facility] Rx#:[id number]   


 


  Oral 240 240 


 


Other:   


 


  # Voids 1  


 


  Weight 77.111 kg  














Results


Labs: 


                  Abnormal Lab Results - Last 24 Hours (Table)











  21 Range/Units





  12:38 12:38 


 


ESR   47 H  (0-15)  mm/hr


 


C-Reactive Protein  7.1 H   (<1.0)  mg/dL

## 2021-12-07 NOTE — P.PN
Subjective


Progress Note Date: 21





HISTORY OF PRESENT ILLNESS


This is a 51-year-old male patient of [facility] with past medical history 

of back to use and dependence.  Patient presented to the hospital with a d

raining wound on the left fourth finger.  Patient was initially admitted on 

. He was a very poor historian, and again will not give any details about 

the injury.  Patient was seen at that time by Dr. Gonzalez and underwent I&D 

of the left dorsal hand and I&D of the left ring finger flexor sheath, left ring

finger A1 pulley release for flexor tenosynovitis of the left ring finger.  

Ulcers were obtained during surgery.  [place] police department contacted the

hospital regarding need to take suspect and arrangements were made for him to be

quickly discharged.  Patient now returns due to ongoing infection and need for 

antibiotics.  Patient has significantly less swelling and redness to the left 

hand and finger, sutures in place to the left fourth digit.  He is seen today in

the emergency center waiting for a Medr bed.  Consults in place for 

orthopedics and infectious disease.  At the time of discharge, please officers 

planning for patient being arrested and we are hoping that cultures will be back

by tomorrow.





 patient examined bedside.  Complains of diffuse pain all over his body.  

Mild drainage noted involving the ring finger but otherwise no significant 

swelling noted.  Patient to continueNorco for pain control.  Infectious disease 

recommends to weeks of IV vancomycin for presumptive MRSA.  Sensitivities are 

currently pending.  Dressing changes are recommended daily for with her bed 

Hibiclens soaks for 15 minutes twice a day





REVIEW OF SYSTEMS


Constitutional: No fever, no chills, no night sweats.  No weight change.  No 

weakness, fatigue or lethargy.  No daytime sleepiness.


EENT: No headache.  No blurred vision or double vision, no loss of vision.  No 

loss of Hearing, no ringing in the ears, no dizziness.  No nasal drainage or con

gestion.  No epistaxis.  No sore throat.


Lungs: No shortness of breath, cough, no sputum production.  No wheezing.


Cardiovascular: No chest pain, no lower extremity edema.  No palpitations.  No 

paroxysmal nocturnal dyspnea.  No orthopnea.  No lightheadedness or dizziness.  

No syncopal episodes.


Abdominal: No abdominal pain.  No nausea, vomiting.  No diarrhea.  No 

constipation.  No bloody or tarry stools.  No loss of appetite.


Genitourinary: No dysuria, increased frequency, urgency.  No urinary retention.


Musculoskeletal: No myalgias.  No muscle weakness, no gait dysfunction, no 

frequent falls.  No back pain.  No neck pain.


Integumentary: Noted wounds Left 4th finger, no lesions.  No rash or pruritus.  

No unusual bruising.  No change in hair or nails.


Neurologic: No aphasia. No facial droop. No change in mentation. No head injury.

No headache. No paralysis. No paresthesia.


Psychiatric: No depression.  No anxiety.  No mood swings.


Endocrine: No abnormal blood sugars.  No weight change.  No excessive sweating 

or thirst.  No cold intolerance.  





SOCIAL HISTORY


Patient is a smoker one pack per day since he was 14 years of age.  He denies 

any alcohol use, street drug use or marijuana use.  He is single.  Patient is 

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





FAMILY HISTORY


Mother  at age 37 from lymphoma.  Father  from a accident at work.  

Patient has one brother and 3 sisters with no major medical problems.  Patient 

is 2 daughters with no major medical problems.





PHYSICAL EXAMINATION


Gen: This is a 51-year-old male, resting in the ER stretcher, patient is in no 

acute distress.   at bedside


HEENT: Head is atraumatic, normocephalic. Pupils equal, round. Sclerae is 

anicteric. 


NECK: Supple. No JVD. No lymphadenopathy. No thyromegaly. 


LUNGS: Clear to auscultation. No wheezes or rhonchi.  No intercostal 

retractions.


HEART: Regular rate and rhythm. No murmur. 


ABDOMEN: Soft. Bowel sounds are present. No masses.  No tenderness.


EXTREMITIES: No pedal edema.  No calf tenderness.  Dressing in place to the left

hand and forearm.  Sutures in place to the left fourth digit, edema and redness 

improved from yesterday.  to the hand wrist and into the distal forearm.


NEUROLOGICAL: Patient is awake and gives "I don't know" to all questions.  No 

neural deficits. Cranial nerves 2 through 12 are grossly intact. 





ASSESSMENT AND PLAN


1.  Sepsis secondary to tenosynovitis left fourth finger status post I&D.  

Patient admitted to the [facility] floor, consult with orthopedics and infectious 

disease.  Continue  vancomycin, wound culture and blood culture in process.  

Presumptive MRSA with sensitivities pending  Continue Dilaudid, ibuprofen for 

pain.  Obtain CRP and sed rate.  Infectious diseases recommend 2 weeks of IV 

antibiotics.  This measured consulted





2.  Tobacco use and dependence.  Patient will be started on nicotine patch.





3.  GI prophylaxis.  Protonix 40 mg oral daily.





4.  DVT prophylaxis.  Heparin subcu.





5. COVID-19 testing negative.  Patient has been hospitalized during a pandemic.











DISCHARGE PLAN


Return to care home in the next 24 hours.





Objective





- Vital Signs


Vital signs: 


                                   Vital Signs











Temp  98.5 F   21 14:52


 


Pulse  69   21 14:52


 


Resp  18   21 14:52


 


BP  89/59   21 14:52


 


Pulse Ox  98   21 14:52








                                 Intake & Output











 21





 18:59 06:59 18:59


 


Intake Total 1280 1440 240


 


Balance 1280 1440 240


 


Weight 77.111 kg  


 


Intake:   


 


  Intake, IV Titration 800 840 





  Amount   


 


    Sodium Chloride 0.9% 1, 500 390 





    000 ml @ 130 mls/hr IV .   





    Q7H42M [facility] Rx#:[id number]   


 


    Vancomycin 1,500 mg In  250 





    Sodium Chloride 0.9% 250   





    ml @ 125 mls/hr IVPB ONCE   





    [facility] Rx#:[id number]   


 


    Vancomycin 1,500 mg In 250  





    Sodium Chloride 0.9% 250   





    ml @ 125 mls/hr IVPB Q12H   





    [facility] Rx#:[id number]   


 


    cefTRIAXone 1 gm In 50 200 





    Sodium Chloride 0.9% 50   





    ml @ 100 mls/hr IVPB   





    Q24HR [facility] Rx#:[id number]   


 


  Oral 480 600 240


 


Other:   


 


  # Voids 1 1 1














- Labs


CBC & Chem 7: 


                                 21 09:10





                                 21 09:10


Labs: 


                  Abnormal Lab Results - Last 24 Hours (Table)











  21 Range/Units





  09:10 09:10 


 


RBC  3.78 L   (4.30-5.90)  m/uL


 


Hgb  11.7 L   (13.0-17.5)  gm/dL


 


Hct  34.7 L   (39.0-53.0)  %


 


Plt Count  504 H   (150-450)  k/uL


 


Chloride   109 H  ()  mmol/L


 


BUN   6 L  (9-20)  mg/dL


 


Total Protein   5.6 L  (6.3-8.2)  g/dL


 


Albumin   2.9 L  (3.5-5.0)  g/dL

## 2021-12-07 NOTE — P.PN
Progress Note - Text


Progress Note Date: 12/07/21


Diagnosis:





left hand and ring finger abscess and flexor tenosynovitis





Subjective:





Patient was seen at bedside this morning resting comfortably. sitting up in bed 

with Kerlix wrap present on the left hand and officer at the end of bed during 

encounter.  Patient says he is in a moderate amount of pain as well as pain 

medication.  Patient denies chest pain, fever, shortness breath, nausea, 

vomiting, change in vision, loss of bowel/bladder control





Objective:





Kerlix dressing was removed.  Incision on the palmar side as cane, dry, intact. 

Sutures are in good place and well aligned.  Negative for any flexion/purulence.

 There is a moderate amount of swelling within the left fourth digit.  Dorsal 

side of the fourth digit with a Penrose drain sutures are intact and well 

aligned.  There is some minimal drainage near the Penrose drain.  Patient is a 

moderate amount of pain when he tries to extend digits in the left hand 

especially the left fourth digit.  Patient has tenderness to palpation 

throughout the left fourth digit.  Neurovascular status intact.  Cap refill is 

below 3 seconds bilaterally in digits of hand.  Radial pulses intact, 

bilaterally 2+.  Negative Homans bilaterally





Assessment:





Left ring finger abscess and flexor tenosynovitis





- Postop day #2 status post left hand and ring finger I&D





Plan:





1. Left ring finger abscess and flexor tenosynovitis - left hand and ring finger

incision and drainage of abscess and flexor tenosynovitis - surgery performed 

Sunday, 12/05/2021.  Patient stable at bedside this morning.  Continue Hibiclens

soaks for 15 minutes at a time twice a day.  Dressing changes daily





2.  Appreciate medical management





3.  Appreciate ID management - awaiting final cultures for antibiotic treatment





4.  Pain management - Norco





5.  GI prophylaxis - Protonix





6.  DVT prophylaxis - mechanical





7.  PT/OT - encourage patient to flex and extend digits left hand.  

Nonweightbearing left hand

## 2021-12-07 NOTE — P.PN
Subjective


Progress Note Date: 12/06/21





Patient seen and examined in the ER.  There are some social issues that arose 

yesterday after surgery which prompted discharge from the hospital and then 

readmission.  Patient is a pain in his left hand but is otherwise doing okay.  

Denies fevers chills shortness breath or chest pain denies any nausea vomiting 

denies any numbness or tingling.





Objective





- Vital Signs


Vital signs: 


                                   Vital Signs











Temp  97.5 F L  12/07/21 01:26


 


Pulse  74   12/07/21 01:26


 


Resp  17   12/07/21 01:26


 


BP  111/68   12/07/21 01:26


 


Pulse Ox  99   12/07/21 01:26








                                 Intake & Output











 12/06/21 12/06/21 12/07/21





 06:59 18:59 06:59


 


Intake Total  1280 1440


 


Balance  1280 1440


 


Weight 77.111 kg 77.111 kg 


 


Intake:   


 


  Intake, IV Titration  800 840





  Amount   


 


    Sodium Chloride 0.9% 1,  500 390





    000 ml @ 130 mls/hr IV .   





    Q7H42M [facility] Rx#:[id number]   


 


    Vancomycin 1,500 mg In   250





    Sodium Chloride 0.9% 250   





    ml @ 125 mls/hr IVPB ONCE   





    [facility] Rx#:[id number]   


 


    Vancomycin 1,500 mg In  250 





    Sodium Chloride 0.9% 250   





    ml @ 125 mls/hr IVPB Q12H   





    [facility] Rx#:[id number]   


 


    cefTRIAXone 1 gm In  50 200





    Sodium Chloride 0.9% 50   





    ml @ 100 mls/hr IVPB   





    Q24HR [facility] Rx#:[id number]   


 


  Oral  480 600


 


Other:   


 


  # Voids  1 1














- Exam





Alert and oriented 3 appears well-nourished well-hydrated in no acute distress 

his left upper extremity is examined the dressing is clean and dry he has good 

cap refill distally in all fingertips she has intact sensation median radial 

ulnar nerve.  Intact motor median radial ulnar nerve.  There are some drainage 

from that ring finger.  Otherwise swelling is somewhat better although still.  

Fairly swollen at this time.  No other issues noted at this time.





- Labs


Labs: 


                  Abnormal Lab Results - Last 24 Hours (Table)











  12/06/21 12/06/21 Range/Units





  12:38 12:38 


 


ESR   47 H  (0-15)  mm/hr


 


C-Reactive Protein  7.1 H   (<1.0)  mg/dL














Assessment and Plan


Assessment: 





51-year-old male postop day 1 left hand and ring finger incision and drainage of

abscess and flexor tenosynovitis


Plan: 





Hibiclens soaks 3 times a day


Pain control as needed


Therapy


ID for IV antibiotics


We will monitor

## 2021-12-08 VITALS — SYSTOLIC BLOOD PRESSURE: 105 MMHG | DIASTOLIC BLOOD PRESSURE: 62 MMHG | TEMPERATURE: 98.2 F | HEART RATE: 56 BPM

## 2021-12-08 VITALS — RESPIRATION RATE: 18 BRPM

## 2021-12-08 RX ADMIN — CEFAZOLIN SCH MLS/HR: 330 INJECTION, POWDER, FOR SOLUTION INTRAMUSCULAR; INTRAVENOUS at 09:50

## 2021-12-08 RX ADMIN — SODIUM CHLORIDE SCH MLS/HR: 9 INJECTION, SOLUTION INTRAVENOUS at 03:56

## 2021-12-08 RX ADMIN — PANTOPRAZOLE SODIUM SCH: 40 TABLET, DELAYED RELEASE ORAL at 07:42

## 2021-12-08 RX ADMIN — HYDROMORPHONE HYDROCHLORIDE PRN MG: 1 INJECTION, SOLUTION INTRAMUSCULAR; INTRAVENOUS; SUBCUTANEOUS at 07:39

## 2021-12-08 RX ADMIN — HEPARIN SODIUM SCH: 5000 INJECTION INTRAVENOUS; SUBCUTANEOUS at 07:43

## 2021-12-08 RX ADMIN — NICOTINE SCH: 21 PATCH, EXTENDED RELEASE TRANSDERMAL at 07:43

## 2021-12-08 NOTE — P.PN
Subjective


Progress Note Date: 12/08/21





Patient seen and examined this morning that.  Bedside.  He states pain but it is

getting better in his left hand and ring finger.  He states is painful to move. 

He has been doing Hibiclens soaks 3 times a day with nursing.  He is on 

antibiotics currently.  He states no new symptoms no increased numbness or 

tingling no other issues at this time.  No fevers chills shortness breath or 

chest pain





Objective





- Vital Signs


Vital signs: 


                                   Vital Signs











Temp  98.2 F   12/08/21 07:00


 


Pulse  56 L  12/08/21 07:00


 


Resp  18   12/08/21 07:00


 


BP  105/62   12/08/21 07:00


 


Pulse Ox  99   12/08/21 07:00








                                 Intake & Output











 12/07/21 12/08/21 12/08/21





 18:59 06:59 18:59


 


Intake Total 480 700 


 


Balance 480 700 


 


Intake:   


 


  Oral 480 700 


 


Other:   


 


  # Voids 1 1 














- Exam





Alert and oriented 3 appears well-nourished well-hydrated in no acute distress 

his left upper extremity is examined the dressing is clean and dry he has good 

cap refill distally in all fingertips she has intact sensation median radial 

ulnar nerve.  Intact motor median radial ulnar nerve.  





Left hand looking much better swelling is improved greatly with volar incision 

is healing well with no issues no drainage dorsal incision having some drainage 

from it still in the area of the previous abscess.  This is mild in nature.  We 

will continue to monitor it.  He is very painful with any motion of his finger 

at this time.  This will get better with time and he is encouraged to gently 

range of motion his finger on his own.





- Constitutional


General appearance: Present: cooperative





- Labs


CBC & Chem 7: 


                                 12/07/21 09:10





                                 12/08/21 06:21


Labs: 


                  Abnormal Lab Results - Last 24 Hours (Table)











  12/07/21 12/07/21 Range/Units





  09:10 09:10 


 


RBC  3.78 L   (4.30-5.90)  m/uL


 


Hgb  11.7 L   (13.0-17.5)  gm/dL


 


Hct  34.7 L   (39.0-53.0)  %


 


Plt Count  504 H   (150-450)  k/uL


 


Chloride   109 H  ()  mmol/L


 


BUN   6 L  (9-20)  mg/dL


 


Total Protein   5.6 L  (6.3-8.2)  g/dL


 


Albumin   2.9 L  (3.5-5.0)  g/dL














Assessment and Plan


Assessment: 





51-year-old male postop day 3 left hand and ring finger incision and drainage of

abscess and flexor tenosynovitis


Plan: 





Continue Hibiclens soaks 3 times a day


Pain control as needed


Therapy


ID for IV antibiotics.  Agree with IV antibiotics for a couple weeks given his 

MRSA cultures


We will follow

## 2021-12-08 NOTE — PN
PROGRESS NOTE



DATE OF SERVICE:

12/08/2021



REASON FOR FOLLOWUP:

Left fourth finger MRSA abscess and cellulitis.



INTERVAL HISTORY:

The patient is afebrile.  The patient was seen on rounds this morning. The patient's

pain to the index finger is currently controlled.  No chest pain, shortness of breath

or cough.  No abdominal pain or diarrhea.



PHYSICAL EXAMINATION:

Blood pressure 105/62 with a pulse of 53, temperature 98.2. General description is a

middle-aged male up in the bed in no distress. Respiratory system: Unlabored breathing,

clear to auscultation anteriorly. Heart S1, S2.  Regular rate and rhythm.  Abdomen

soft, no tenderness. Left fourth finger swelling and redness has decreased. No

drainage.



LABS:

Hemoglobin is 11.6, white count 6.6, creatinine 0.91.



DIAGNOSTIC IMPRESSION AND PLAN:

Patient with left fourth finger abscess and cellulitis, status post drainage.  Culture

with MRSA.  Unfortunately, the _____ could not do the IV vancomycin. Will switch him

over to Zyvox _____ mg twice a day for 2 weeks and close outpatient followup.





MMODL / IJN: [id number] / Job#: [id number]

## 2021-12-08 NOTE — PN
PROGRESS NOTE



DATE OF SERVICE:

12/07/2021



REASON FOR FOLLOW UP:

Left fourth finger extensive infection.



INTERVAL HISTORY:

The patient is afebrile.  The patient is currently breathing comfortably.  Denies

having any chest pain, shortness of breath or cough.  Still complaining of pain to the

left fourth finger, some has decreased with pain medication.



PHYSICAL EXAMINATION:

Blood pressure 109/72 with a pulse of 71, temperature 98.1. He is 98% on room air.

General description is a middle-aged male up in the bed in no distress. Respiratory

system: Unlabored breathing, clear to auscultation anteriorly. Heart S1, S2.  Regular

rate and rhythm. Abdomen soft, no tenderness.  Left fourth finger is currently dressed

with no obvious drainage on the dressing.



LABS:

Hemoglobin 11.7, white count 6.6, creatinine 0.77.



DIAGNOSTIC IMPRESSION AND PLAN:

Patient with left fourth finger abscess and cellulitis status post drainage.  Culture

with MRSA.  Patient is covered with vancomycin in view of extensive infection.  Patient

will benefit from at least a week or 2 of IV vancomycin, which should be arranged.

Continue supportive care.





MMODL / IJN: [id number] / Job#: [id number]

## 2021-12-08 NOTE — P.DS
Providers


Date of admission: 


12/06/21 02:02





Expected date of discharge: 12/08/21


Attending physician: 


Vin Davalos





Consults: 





                                        





12/06/21 07:20


Consult Physician Routine 


   Consulting Provider: Kaleigh Jeff


   Consult Reason/Comments: flexor teno, dorsal hand abscess


   Do you want consulting provider notified?: Yes





12/06/21 07:22


Consult Physician Routine 


   Consulting Provider: Dickson Solares


   Consult Reason/Comments: flexor teno


   Do you want consulting provider notified?: Already Contacted











Primary care physician: 


[facility] Course: 





HISTORY OF PRESENT ILLNESS


This is a 51-year-old male patient of [facility] with past medical history 

of back to use and dependence.  Patient presented to the hospital with a 

draining wound on the left fourth finger.  Patient was initially admitted on 

12/5. He was a very poor historian, and again will not give any details about 

the injury.  Patient was seen at that time by Dr. Gonzalez and underwent I&D 

of the left dorsal hand and I&D of the left ring finger flexor sheath, left ring

finger A1 pulley release for flexor tenosynovitis of the left ring finger.  

Ulcers were obtained during surgery.  [place] police department contacted the

hospital regarding need to take suspect and arrangements were made for him to be

quickly discharged.  Patient now returns due to ongoing infection and need for 

antibiotics.  Patient has significantly less swelling and redness to the left 

hand and finger, sutures in place to the left fourth digit.  He is seen today in

the emergency center waiting for a MedSur bed.  Consults in place for 

orthopedics and infectious disease.  At the time of discharge, please officers 

planning for patient being arrested and we are hoping that cultures will be back

by tomorrow.





12/7 patient examined bedside.  Complains of diffuse pain all over his body.  

Mild drainage noted involving the ring finger but otherwise no significant 

swelling noted.  Patient to continueNorco for pain control.  Infectious disease 

recommends to weeks of IV vancomycin for presumptive MRSA.  Sensitivities are 

currently pending.  Dressing changes are recommended daily for with her bed 

Hibiclens soaks for 15 minutes twice a day





12/8: All wound cultures are showing MRSA has been reassessed by Dr. Jeff and 

has been cleared for discharge on Zyvox for a two-week period.  He has been 

afebrile, heart rate 56, blood pressure 105/62, pulse ox 99% on room air.  

Patient and placed officer have been updated.  New prescriptions have been 

provided to the .  Patient will be discharged today in stable 

condition








DISCHARGE DIAGNOSES


1.  Sepsis secondary to tenosynovitis left fourth finger status post I&D.  


2.  Tobacco use and dependence.  


3.  COVID-19 testing negative.  Patient has been hospitalized during a pandemic.








DISCHARGE PLAN


Return to group home in the next 24 hours.


Greater than 35 minutes was utilized and coordinating patient's discharge.


Impression and plan of care have been directed as dictated by the signing 

physician.  Madai Maldonado nurse practitioner acting as scribe for signing 

physician.





Patient Condition at Discharge: Stable





Plan - Discharge Summary


Discharge Rx Participant: No


New Discharge Prescriptions: 


New


   Pantoprazole [Protonix] 40 mg PO AC-BRKFST #30 tab


   Nicotine 21Mg/24Hr Patch [Habitrol] 1 patch TRANSDERM DAILY #30 patch


   Ibuprofen [Motrin] 400 mg PO Q6HR PRN  tab


     PRN Reason: Mild Pain Or Fever > 100.5


   Linezolid [Zyvox] 600 mg PO Q12H #28 tab





Continue


   Lexapro(Unknown) 1 tab PO DAILY


Discharge Medication List





Lexapro(Unknown) 1 tab PO DAILY 12/06/21 [History]


Ibuprofen [Motrin] 400 mg PO Q6HR PRN  tab 12/08/21 [Rx]


Linezolid [Zyvox] 600 mg PO Q12H #28 tab 12/08/21 [Rx]


Nicotine 21Mg/24Hr Patch [Habitrol] 1 patch TRANSDERM DAILY #30 patch 12/08/21 

[Rx]


Pantoprazole [Protonix] 40 mg PO AC-BRKFST #30 tab 12/08/21 [Rx]








Follow up Appointment(s)/Referral(s): 


[facility] - [facility] days


Edd Gonzalez DO [Doctor of Osteopathic Medicine] - 12/16/21 1:20 pm


Kaleigh Jeff MD [STAFF PHYSICIAN] - 12/13/21 1:00 pm (Appointment made at the 

[facility] office . )


Patient Instructions/Handouts:  Cellulitis (GEN), Upper Extremity Tenosynovitis 

(GEN), Abscess Incision and Drainage (DC)

## 2022-01-28 ENCOUNTER — HOSPITAL ENCOUNTER (INPATIENT)
Dept: HOSPITAL 47 - EC | Age: 52
LOS: 3 days | Discharge: HOME | DRG: 571 | End: 2022-01-31
Attending: INTERNAL MEDICINE | Admitting: INTERNAL MEDICINE
Payer: COMMERCIAL

## 2022-01-28 DIAGNOSIS — I10: ICD-10-CM

## 2022-01-28 DIAGNOSIS — F43.10: ICD-10-CM

## 2022-01-28 DIAGNOSIS — Z79.899: ICD-10-CM

## 2022-01-28 DIAGNOSIS — N17.9: ICD-10-CM

## 2022-01-28 DIAGNOSIS — F33.9: ICD-10-CM

## 2022-01-28 DIAGNOSIS — L97.129: ICD-10-CM

## 2022-01-28 DIAGNOSIS — Z88.0: ICD-10-CM

## 2022-01-28 DIAGNOSIS — L02.416: ICD-10-CM

## 2022-01-28 DIAGNOSIS — Z20.822: ICD-10-CM

## 2022-01-28 DIAGNOSIS — Z80.7: ICD-10-CM

## 2022-01-28 DIAGNOSIS — F41.1: ICD-10-CM

## 2022-01-28 DIAGNOSIS — L02.415: Primary | ICD-10-CM

## 2022-01-28 DIAGNOSIS — E86.0: ICD-10-CM

## 2022-01-28 DIAGNOSIS — F17.200: ICD-10-CM

## 2022-01-28 DIAGNOSIS — Z86.14: ICD-10-CM

## 2022-01-28 DIAGNOSIS — L03.115: ICD-10-CM

## 2022-01-28 LAB
ALBUMIN SERPL-MCNC: 4.1 G/DL (ref 3.5–5)
ALP SERPL-CCNC: 63 U/L (ref 38–126)
ALT SERPL-CCNC: 14 U/L (ref 4–49)
ANION GAP SERPL CALC-SCNC: 10 MMOL/L
APTT BLD: 24.9 SEC (ref 22–30)
AST SERPL-CCNC: 30 U/L (ref 17–59)
BASOPHILS # BLD AUTO: 0 K/UL (ref 0–0.2)
BASOPHILS NFR BLD AUTO: 1 %
BUN SERPL-SCNC: 16 MG/DL (ref 9–20)
CALCIUM SPEC-MCNC: 9.2 MG/DL (ref 8.4–10.2)
CHLORIDE SERPL-SCNC: 104 MMOL/L (ref 98–107)
CO2 SERPL-SCNC: 24 MMOL/L (ref 22–30)
EOSINOPHIL # BLD AUTO: 0.3 K/UL (ref 0–0.7)
EOSINOPHIL NFR BLD AUTO: 4 %
ERYTHROCYTE [DISTWIDTH] IN BLOOD BY AUTOMATED COUNT: 3.84 M/UL (ref 4.3–5.9)
ERYTHROCYTE [DISTWIDTH] IN BLOOD: 13.8 % (ref 11.5–15.5)
GLUCOSE SERPL-MCNC: 101 MG/DL (ref 74–99)
HCT VFR BLD AUTO: 34.5 % (ref 39–53)
HGB BLD-MCNC: 11.9 GM/DL (ref 13–17.5)
INR PPP: 0.9 (ref ?–1.2)
LYMPHOCYTES # SPEC AUTO: 1.5 K/UL (ref 1–4.8)
LYMPHOCYTES NFR SPEC AUTO: 21 %
MCH RBC QN AUTO: 31 PG (ref 25–35)
MCHC RBC AUTO-ENTMCNC: 34.5 G/DL (ref 31–37)
MCV RBC AUTO: 89.9 FL (ref 80–100)
MONOCYTES # BLD AUTO: 0.4 K/UL (ref 0–1)
MONOCYTES NFR BLD AUTO: 6 %
NEUTROPHILS # BLD AUTO: 4.8 K/UL (ref 1.3–7.7)
NEUTROPHILS NFR BLD AUTO: 66 %
PLATELET # BLD AUTO: 397 K/UL (ref 150–450)
POTASSIUM SERPL-SCNC: 4 MMOL/L (ref 3.5–5.1)
PROT SERPL-MCNC: 7.4 G/DL (ref 6.3–8.2)
PT BLD: 10 SEC (ref 9–12)
SODIUM SERPL-SCNC: 138 MMOL/L (ref 137–145)
WBC # BLD AUTO: 7.2 K/UL (ref 3.8–10.6)

## 2022-01-28 PROCEDURE — 85730 THROMBOPLASTIN TIME PARTIAL: CPT

## 2022-01-28 PROCEDURE — 96365 THER/PROPH/DIAG IV INF INIT: CPT

## 2022-01-28 PROCEDURE — 82565 ASSAY OF CREATININE: CPT

## 2022-01-28 PROCEDURE — 96372 THER/PROPH/DIAG INJ SC/IM: CPT

## 2022-01-28 PROCEDURE — 87205 SMEAR GRAM STAIN: CPT

## 2022-01-28 PROCEDURE — 87635 SARS-COV-2 COVID-19 AMP PRB: CPT

## 2022-01-28 PROCEDURE — 80202 ASSAY OF VANCOMYCIN: CPT

## 2022-01-28 PROCEDURE — 99284 EMERGENCY DEPT VISIT MOD MDM: CPT

## 2022-01-28 PROCEDURE — 85025 COMPLETE CBC W/AUTO DIFF WBC: CPT

## 2022-01-28 PROCEDURE — 88312 SPECIAL STAINS GROUP 1: CPT

## 2022-01-28 PROCEDURE — 87077 CULTURE AEROBIC IDENTIFY: CPT

## 2022-01-28 PROCEDURE — 83605 ASSAY OF LACTIC ACID: CPT

## 2022-01-28 PROCEDURE — 88305 TISSUE EXAM BY PATHOLOGIST: CPT

## 2022-01-28 PROCEDURE — 87186 SC STD MICRODIL/AGAR DIL: CPT

## 2022-01-28 PROCEDURE — 87070 CULTURE OTHR SPECIMN AEROBIC: CPT

## 2022-01-28 PROCEDURE — 36415 COLL VENOUS BLD VENIPUNCTURE: CPT

## 2022-01-28 PROCEDURE — 88304 TISSUE EXAM BY PATHOLOGIST: CPT

## 2022-01-28 PROCEDURE — 80053 COMPREHEN METABOLIC PANEL: CPT

## 2022-01-28 PROCEDURE — 96368 THER/DIAG CONCURRENT INF: CPT

## 2022-01-28 PROCEDURE — 87040 BLOOD CULTURE FOR BACTERIA: CPT

## 2022-01-28 PROCEDURE — 87075 CULTR BACTERIA EXCEPT BLOOD: CPT

## 2022-01-28 PROCEDURE — 85610 PROTHROMBIN TIME: CPT

## 2022-01-28 RX ADMIN — HYDROCODONE BITARTRATE AND ACETAMINOPHEN PRN EACH: 5; 325 TABLET ORAL at 18:39

## 2022-01-28 RX ADMIN — CEFAZOLIN SCH MLS/HR: 330 INJECTION, POWDER, FOR SOLUTION INTRAMUSCULAR; INTRAVENOUS at 17:32

## 2022-01-28 RX ADMIN — HYDROCODONE BITARTRATE AND ACETAMINOPHEN PRN EACH: 5; 325 TABLET ORAL at 22:47

## 2022-01-28 NOTE — ED
General Adult HPI





- General


Chief complaint: Skin/Abscess/Foreign Body


Stated complaint: bilateral leg wounds


Time Seen by Provider: 22 16:46


Source: patient, police, RN notes reviewed, old records reviewed


Mode of arrival: ambulatory


Limitations: no limitations





- History of Present Illness


Initial comments: 





51-year-old male presenting for evaluation of pain and swelling in his right 

lower leg and left posterior thigh.  Patient has previous history of multiple 

MRSA infections.  He's noticed some drainage from both of these sites.  He was 

placed on oral antibiotics and intramuscular ceftriaxone approximately one week 

ago.  His symptoms are worsening.  No reported fever.  No history diabetes.  No 

history of IV drug use.





- Related Data


                                Home Medications











 Medication  Instructions  Recorded  Confirmed


 


Ciprofloxacin HCl [Cipro] 500 mg PO Q12HR 22


 


Escitalopram [Lexapro] 20 mg PO DAILY 22


 


QUEtiapine [SEROquel] 100 mg PO HS 22


 


busPIRone HCL 15 mg PO BID 22


 


cefTRIAXone [Rocephin] 1 gm IVPB Q24H 22


 


cloNIDine HCL [Catapres] 0.1 mg PO HS 22











                                    Allergies











Allergy/AdvReac Type Severity Reaction Status Date / Time


 


Penicillins Allergy  Unknown Verified 22 18:13





   Childhood  














Review of Systems


ROS Statement: 


Those systems with pertinent positive or pertinent negative responses have been 

documented in the HPI.





ROS Other: All systems not noted in ROS Statement are negative.





Past Medical History


Past Medical History: No Reported History


Additional Past Medical History / Comment(s): back pain , cellulitis


History of Any Multi-Drug Resistant Organisms: None Reported


Date of last positivie culture/infection: 21


MDRO Source:: MRSA FINGER


Past Surgical History: Orthopedic Surgery


Additional Past Surgical History / Comment(s): wisdom teeth, I&D-lt finger


Past Anesthesia/Blood Transfusion Reactions: No Reported Reaction


Past Psychological History: No Psychological Hx Reported


Smoking Status: Former smoker


Past Alcohol Use History: None Reported


Past Drug Use History: None Reported





- Past Family History


  ** Mother


Family Medical History: Cancer


Additional Family Medical History / Comment(s): Mother had cancer in her lymph 

nodes/she is .





  ** Father


Family Medical History: No Reported History





General Exam


Limitations: no limitations


General appearance: alert, in no apparent distress


Head exam: Present: atraumatic, normocephalic


Eye exam: Present: normal appearance, PERRL


ENT exam: Present: normal exam


Neck exam: Present: normal inspection.  Absent: tenderness, meningismus


Respiratory exam: Present: normal lung sounds bilaterally.  Absent: respiratory 

distress, wheezes


Cardiovascular Exam: Present: regular rate, normal rhythm


GI/Abdominal exam: Present: soft.  Absent: distended, tenderness, guarding


Extremities exam: Present: other (Freely draining abscess to the anterior right 

shin approximately 3 cm round.  Draining abscess to the posterior left thigh 

approximately 3 cm round with significant erythema and surrounding cellulitis.)


Neurological exam: Present: alert, oriented X3, CN II-XII intact.  Absent: motor

 sensory deficit


Psychiatric exam: Present: normal affect, normal mood


Skin exam: Present: warm, dry, other (Abscess as described, right anterior shin,

 will left posterior thigh).  Absent: cyanosis, diaphoretic





Course


                                   Vital Signs











  22





  16:41 17:43


 


Temperature 98.9 F 


 


Pulse Rate 71 59 L


 


Respiratory 20 18





Rate  


 


Blood Pressure 135/80 109/63


 


O2 Sat by Pulse 99 99





Oximetry  














Medical Decision Making





- Medical Decision Making





51-year-old male presenting for evaluation of pain and swelling in the right leg

 and the left thigh.  Patient has significant abscess to both lower extremities.

  There is surrounding cellulitis and induration predominately in the left 

posterior thigh.  Both of these are draining purulent material.  Wound culture 

is obtained as well as blood cultures.  Laboratory testing is obtained and 

patient is initiated on IV antibiotics.  He had previously been on both oral and

 intramuscular antibiotics without improvement.  Case discussed with Dr. De La Rosa 

who will admit.





- Lab Data


Result diagrams: 


                                 22 17:15





                                 22 17:15


                                   Lab Results











  22 Range/Units





  17:15 17:15 17:15 


 


WBC  7.2    (3.8-10.6)  k/uL


 


RBC  3.84 L    (4.30-5.90)  m/uL


 


Hgb  11.9 L    (13.0-17.5)  gm/dL


 


Hct  34.5 L    (39.0-53.0)  %


 


MCV  89.9    (80.0-100.0)  fL


 


MCH  31.0    (25.0-35.0)  pg


 


MCHC  34.5    (31.0-37.0)  g/dL


 


RDW  13.8    (11.5-15.5)  %


 


Plt Count  397    (150-450)  k/uL


 


MPV  7.4    


 


Neutrophils %  66    %


 


Lymphocytes %  21    %


 


Monocytes %  6    %


 


Eosinophils %  4    %


 


Basophils %  1    %


 


Neutrophils #  4.8    (1.3-7.7)  k/uL


 


Lymphocytes #  1.5    (1.0-4.8)  k/uL


 


Monocytes #  0.4    (0-1.0)  k/uL


 


Eosinophils #  0.3    (0-0.7)  k/uL


 


Basophils #  0.0    (0-0.2)  k/uL


 


PT     (9.0-12.0)  sec


 


INR     (<1.2)  


 


APTT     (22.0-30.0)  sec


 


Sodium   138   (137-145)  mmol/L


 


Potassium   4.0   (3.5-5.1)  mmol/L


 


Chloride   104   ()  mmol/L


 


Carbon Dioxide   24   (22-30)  mmol/L


 


Anion Gap   10   mmol/L


 


BUN   16   (9-20)  mg/dL


 


Creatinine   1.50 H   (0.66-1.25)  mg/dL


 


Est GFR (CKD-EPI)AfAm   62   (>60 ml/min/1.73 sqM)  


 


Est GFR (CKD-EPI)NonAf   53   (>60 ml/min/1.73 sqM)  


 


Glucose   101 H   (74-99)  mg/dL


 


Plasma Lactic Acid Jose D    1.5  (0.7-2.0)  mmol/L


 


Calcium   9.2   (8.4-10.2)  mg/dL


 


Total Bilirubin   0.6   (0.2-1.3)  mg/dL


 


AST   30   (17-59)  U/L


 


ALT   14   (4-49)  U/L


 


Alkaline Phosphatase   63   ()  U/L


 


Total Protein   7.4   (6.3-8.2)  g/dL


 


Albumin   4.1   (3.5-5.0)  g/dL


 


Coronavirus (PCR)     (Not Detectd)  














  22 Range/Units





  17:15 17:15 


 


WBC    (3.8-10.6)  k/uL


 


RBC    (4.30-5.90)  m/uL


 


Hgb    (13.0-17.5)  gm/dL


 


Hct    (39.0-53.0)  %


 


MCV    (80.0-100.0)  fL


 


MCH    (25.0-35.0)  pg


 


MCHC    (31.0-37.0)  g/dL


 


RDW    (11.5-15.5)  %


 


Plt Count    (150-450)  k/uL


 


MPV    


 


Neutrophils %    %


 


Lymphocytes %    %


 


Monocytes %    %


 


Eosinophils %    %


 


Basophils %    %


 


Neutrophils #    (1.3-7.7)  k/uL


 


Lymphocytes #    (1.0-4.8)  k/uL


 


Monocytes #    (0-1.0)  k/uL


 


Eosinophils #    (0-0.7)  k/uL


 


Basophils #    (0-0.2)  k/uL


 


PT  10.0   (9.0-12.0)  sec


 


INR  0.9   (<1.2)  


 


APTT  24.9   (22.0-30.0)  sec


 


Sodium    (137-145)  mmol/L


 


Potassium    (3.5-5.1)  mmol/L


 


Chloride    ()  mmol/L


 


Carbon Dioxide    (22-30)  mmol/L


 


Anion Gap    mmol/L


 


BUN    (9-20)  mg/dL


 


Creatinine    (0.66-1.25)  mg/dL


 


Est GFR (CKD-EPI)AfAm    (>60 ml/min/1.73 sqM)  


 


Est GFR (CKD-EPI)NonAf    (>60 ml/min/1.73 sqM)  


 


Glucose    (74-99)  mg/dL


 


Plasma Lactic Acid Jose D    (0.7-2.0)  mmol/L


 


Calcium    (8.4-10.2)  mg/dL


 


Total Bilirubin    (0.2-1.3)  mg/dL


 


AST    (17-59)  U/L


 


ALT    (4-49)  U/L


 


Alkaline Phosphatase    ()  U/L


 


Total Protein    (6.3-8.2)  g/dL


 


Albumin    (3.5-5.0)  g/dL


 


Coronavirus (PCR)   Not Detected  (Not Detectd)  














Disposition


Clinical Impression: 


 Abscess of right lower leg, Abscess of left thigh, Cellulitis





Disposition: ADMITTED AS IP TO THIS [facility]


Condition: Stable


Is patient prescribed a controlled substance at d/c from ED?: No


Decision to Admit Reason: Admit from EC


Decision Date: 22


Decision Time: 18:07

## 2022-01-29 RX ADMIN — CEFAZOLIN SCH MLS/HR: 330 INJECTION, POWDER, FOR SOLUTION INTRAMUSCULAR; INTRAVENOUS at 17:32

## 2022-01-29 RX ADMIN — CEFAZOLIN SCH: 330 INJECTION, POWDER, FOR SOLUTION INTRAMUSCULAR; INTRAVENOUS at 09:04

## 2022-01-29 RX ADMIN — CEFAZOLIN SCH MLS/HR: 330 INJECTION, POWDER, FOR SOLUTION INTRAMUSCULAR; INTRAVENOUS at 21:46

## 2022-01-29 RX ADMIN — SODIUM CHLORIDE SCH MLS/HR: 9 INJECTION, SOLUTION INTRAVENOUS at 05:42

## 2022-01-29 RX ADMIN — CEFAZOLIN SCH MLS/HR: 330 INJECTION, POWDER, FOR SOLUTION INTRAMUSCULAR; INTRAVENOUS at 02:50

## 2022-01-29 RX ADMIN — SODIUM CHLORIDE SCH MLS/HR: 9 INJECTION, SOLUTION INTRAVENOUS at 17:29

## 2022-01-29 RX ADMIN — HYDROCODONE BITARTRATE AND ACETAMINOPHEN PRN EACH: 5; 325 TABLET ORAL at 12:47

## 2022-01-29 RX ADMIN — HYDROCODONE BITARTRATE AND ACETAMINOPHEN PRN EACH: 5; 325 TABLET ORAL at 17:30

## 2022-01-29 RX ADMIN — HYDROCODONE BITARTRATE AND ACETAMINOPHEN PRN EACH: 5; 325 TABLET ORAL at 21:44

## 2022-01-29 RX ADMIN — HYDROCODONE BITARTRATE AND ACETAMINOPHEN PRN EACH: 5; 325 TABLET ORAL at 02:47

## 2022-01-29 RX ADMIN — HYDROCODONE BITARTRATE AND ACETAMINOPHEN PRN EACH: 5; 325 TABLET ORAL at 09:02
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
## 2022-01-29 NOTE — US
EXAMINATION TYPE: US venous doppler duplex LE RT

 

DATE OF EXAM: 1/29/2022 11:58 AM

 

COMPARISON: NONE

 

CLINICAL HISTORY: r/o DVT. Right lower leg swelling x 10 days with MRSA on legs and hands.

 

SIDE PERFORMED: Right  

 

TECHNIQUE:  The lower extremity deep venous system is examined utilizing real time linear array sonog
citlali with graded compression, doppler sonography and color-flow sonography.

 

VESSELS IMAGED:

Common Femoral Vein

Deep Femoral Vein

Greater Saphenous Vein *

Femoral Vein

Popliteal Vein

Small Saphenous Vein *

Proximal Calf Veins

(* superficial vessels)

 

 

Right Leg:  Negative for DVT. Right groin lymph nodes are seen with largest = 1.8 x 0.8 x 0.7cm.

 

 

The deep venous system of the right lower extremity is patent and compressible with augmentable flow 
throughout and there is no evidence of DVT. 

 

IMPRESSION: 1.8 cm right groin lymph node. No evidence of right lower extremity DVT.
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
## 2022-01-30 PROCEDURE — 0JBN0ZZ EXCISION OF RIGHT LOWER LEG SUBCUTANEOUS TISSUE AND FASCIA, OPEN APPROACH: ICD-10-PCS

## 2022-01-30 RX ADMIN — HYDROCODONE BITARTRATE AND ACETAMINOPHEN PRN EACH: 5; 325 TABLET ORAL at 02:39

## 2022-01-30 RX ADMIN — HYDROCODONE BITARTRATE AND ACETAMINOPHEN PRN EACH: 5; 325 TABLET ORAL at 07:04

## 2022-01-30 RX ADMIN — SODIUM CHLORIDE SCH MLS/HR: 9 INJECTION, SOLUTION INTRAVENOUS at 05:53

## 2022-01-30 RX ADMIN — HYDROCODONE BITARTRATE AND ACETAMINOPHEN PRN EACH: 5; 325 TABLET ORAL at 17:42

## 2022-01-30 RX ADMIN — CEFAZOLIN SCH MLS/HR: 330 INJECTION, POWDER, FOR SOLUTION INTRAMUSCULAR; INTRAVENOUS at 17:38

## 2022-01-30 RX ADMIN — HYDROCODONE BITARTRATE AND ACETAMINOPHEN PRN EACH: 5; 325 TABLET ORAL at 12:21

## 2022-01-30 RX ADMIN — SODIUM CHLORIDE SCH MLS/HR: 9 INJECTION, SOLUTION INTRAVENOUS at 17:39

## 2022-01-30 RX ADMIN — CEFAZOLIN SCH MLS/HR: 330 INJECTION, POWDER, FOR SOLUTION INTRAMUSCULAR; INTRAVENOUS at 02:39

## 2022-01-30 RX ADMIN — BACITRACIN SCH EACH: 500 OINTMENT TOPICAL at 21:19

## 2022-01-30 RX ADMIN — HYDROCODONE BITARTRATE AND ACETAMINOPHEN PRN EACH: 5; 325 TABLET ORAL at 21:56

## 2022-01-30 NOTE — P.OP
Date of Procedure: 01/30/22


Preoperative Diagnosis: 


5 cm necrotic abscess of right lower extremity over the tibial tuberosity, 5 cm 

necrotic abscess of the left posterior thigh, 2 cm skin lesion of the right 

medial thigh consistent with epidermal inclusion cyst with early infection.


Postoperative Diagnosis: 


Same as above


Procedure(s) Performed: 


Excision of necrotic right lower extremity, left posterior thigh abscesses, 

excision of 2 cm right medial thigh skin lesion


Anesthesia: JASPER, local


Surgeon: Gael Iniguez


Estimated Blood Loss (ml): 5


Pathology: other (Necrotic right lower extremity abscess, left posterior thigh 

abscess and right medial thigh skin lesion Smiddy to pathology.  Deep cultures 

from left posterior thigh abscess omitted for Gram stain, aerobes, anaerobes.)


Condition: stable


Disposition: floor


Indications for Procedure: 


Patient is a 51-year-old gentleman who was brought to [facility] 

emergency department 01/29/2022 with a one-week history of progressive pain and.

 1, necrotic weeping from the aforementioned abscesses.  He is failed outpatient

oral, intravenous and intramuscular antibiotics.  He's had multiple issues with 

similar soft tissue infections in the past over the previous year.  He is 

presently incarcerated, no known history of IV drug abuse or diabetes mellitus. 

He is necrotic, indurated, chronically infected areas or in need of definitive 

excision, patient given informed consent for the same after discussion of risks,

and alternatives to treatment.


Operative Findings: 


As above


Description of Procedure: 


Patient was taken to the operative suite and placed in supine position.  

Following initiation of general endotracheal anesthesia he was prepped and 

draped in sterile fashion.  Local anesthesia field block was performed about 

each planned area of excision with percent lidocaine and quarter percent 

Marcaine with epinephrine solution.  The 5 cm right lower extremity and a 5 cm 

left posterior thigh necrotic Abscesses were excised in full-thickness along 

clean subcutaneous planes with Bovie, specimens were submitted.  Cultures were 

submitted from the abscess cavity at the left posterior thigh lesion.  The right

medial thigh skin lesion was similarly excised with Bovie along clean sub

cutaneous tissue planes.  This was consistent with an epidermal inclusion cyst 

with early infection.  Wound cavities were copiously irrigated with sterile 

saline pulse lavage deeper tissues anesthetized with local anesthetic and 

following confirmation of hemostasis skin of the of the elliptical incision for 

the right medial thigh skin lesion was closed with skin stapler loosely and the 

wounds at the right lower extremity over the tibial tuberosity and left 

posterior thigh were left open to heal by secondary intention and all surgical 

sites dressed with bacitracin 4 x 4 gauze and Kerlix wrap.  Patient was 

transferred to the postanesthesia care unit in stable condition.
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
## 2022-01-30 NOTE — P.PN
Subjective


Progress Note Date: 01/30/22


Principal diagnosis: 


Left posterior thigh and right lower leg abscess and cellulitis





Patient is a 51-year-old  male with a past medical history significant 

for recurrent skin and soft tissue infection, presenting to the hospital with 

left posterior thigh and right lower leg abscess and cellulitis in this patient 

with status post surgical drainage of this abscess done on 01/30/2022


On today's evaluation that is 01/30/2022 the patient denies having any fever or 

chills, patient in the left posterior thigh and right lower leg is currently 

controlled, no chest pain shortness of breath or cough no abdominal pain and no 

diarrhea








Objective





- Vital Signs


Vital signs: 


                                   Vital Signs











Temp  97.6 F   01/30/22 14:00


 


Pulse  56 L  01/30/22 14:00


 


Resp  20   01/30/22 14:00


 


BP  110/72   01/30/22 14:00


 


Pulse Ox  99   01/30/22 14:00








                                 Intake & Output











 01/29/22 01/30/22 01/30/22





 18:59 06:59 18:59


 


Intake Total 1810 1760 790


 


Output Total   5


 


Balance 1810 1760 785


 


Intake:   


 


  IV   550


 


  Intake, IV Titration 1810 1560 





  Amount   


 


    Sodium Chloride 0.9% 1, 1560 1560 





    000 ml @ 130 mls/hr IV .   





    [id number] [facility] Rx#:[id number]   


 


    Vancomycin 1,500 mg In 250  





    Sodium Chloride 0.9% 250   





    ml @ 125 mls/hr IVPB Q12H   





    [facility] Rx#:[id number]   


 


  Oral  200 240


 


Output:   


 


  Estimated Blood Loss   5


 


Other:   


 


  # Voids 5 2 














- Exam


GENERAL DESCRIPTION: An middle-aged lying in bed in no distress





RESPIRATORY SYSTEM: Unlabored breathing , decreased breath sounds at bases





HEART: S1 S2 regular rate and rhythm ,





ABDOMEN: Soft , no tenderness





EXTREMITIES: Left posterior thigh and right leg wound is currently dressed








- Labs


CBC & Chem 7: 


                                 01/28/22 17:15





                                 01/30/22 05:40


Labs: 


                  Abnormal Lab Results - Last 24 Hours (Table)











  01/30/22 Range/Units





  05:40 


 


Creatinine  1.26 H  (0.66-1.25)  mg/dL








                      Microbiology - Last 24 Hours (Table)











 01/28/22 17:15 Gram Stain - Preliminary





 Leg - Right Wound Culture - Preliminary





    Presumptive Staph aureus


 


 01/28/22 17:05 Blood Culture - Preliminary





 Blood    No Growth after 24 hours


 


 01/28/22 16:50 Blood Culture - Preliminary





 Blood    No Growth after 24 hours














Assessment and Plan


(1) Abscess of left thigh


Current Visit: Yes   Status: Acute   Code(s): L02.416 - CUTANEOUS ABSCESS OF 

LEFT LOWER LIMB   SNOMED Code(s): 05201358040419637


   





(2) Abscess of right lower leg


Current Visit: Yes   Status: Acute   Code(s): L02.415 - CUTANEOUS ABSCESS OF 

RIGHT LOWER LIMB   SNOMED Code(s): 787108051


   


Plan: 


1-patient with left posterior thigh and right leg abscess and cellulitis 

concerning for MRSA cultures are currently pending patient to continue with the 

vancomycin while waiting for the cultures to finalize and monitor his clinical 

course closely

## 2022-01-30 NOTE — P.PN
Subjective


Progress Note Date: 01/30/22


Principal diagnosis: 





wound infection





patient continued to be hemodynamically stable no major events reported by 

nursing staff a shunt is asking for his psych medication to be continued





Objective





- Vital Signs


Vital signs: 


                                   Vital Signs











Temp  97.6 F   01/30/22 04:30


 


Pulse  54 L  01/30/22 04:30


 


Resp  18   01/30/22 04:30


 


BP  103/59   01/30/22 04:30


 


Pulse Ox  100   01/30/22 04:30








                                 Intake & Output











 01/29/22 01/30/22 01/30/22





 18:59 06:59 18:59


 


Intake Total 1810 1760 400


 


Output Total   5


 


Balance 1810 1760 395


 


Intake:   


 


  IV   400


 


  Intake, IV Titration 1810 1560 





  Amount   


 


    Sodium Chloride 0.9% 1, 1560 1560 





    000 ml @ 130 mls/hr IV .   





    [id number] [facility] Rx#:[id number]   


 


    Vancomycin 1,500 mg In 250  





    Sodium Chloride 0.9% 250   





    ml @ 125 mls/hr IVPB Q12H   





    [facility] Rx#:[id number]   


 


  Oral  200 


 


Output:   


 


  Estimated Blood Loss   5


 


Other:   


 


  # Voids 5 2 














- Exam





Gen.:  in stated age, no acute distress


Heart: Normal S1-S2


Lungs: Clear to auscultation bilaterally


Abdomen: Soft, no tenderness, positive bowel sounds in all 4 quadrant no 

guarding or rebound


Skin: No new rash


Psych: Alert and oriented 3


Neuro: No focal deficit


 left posterior thigh aspect wound seems to be stable from before with the 

clotting tissues.  Right anterior below the knee Wound seems to be draining and 

surrounded with necrotic tissues.  Pulses positive bilaterally





- Labs


CBC & Chem 7: 


                                 01/28/22 17:15





                                 01/30/22 05:40


Labs: 


                  Abnormal Lab Results - Last 24 Hours (Table)











  01/30/22 Range/Units





  05:40 


 


Creatinine  1.26 H  (0.66-1.25)  mg/dL








                      Microbiology - Last 24 Hours (Table)











 01/28/22 17:15 Gram Stain - Preliminary





 Leg - Right Wound Culture - Preliminary





    Presumptive Staph aureus


 


 01/28/22 17:05 Blood Culture - Preliminary





 Blood    No Growth after 24 hours


 


 01/28/22 16:50 Blood Culture - Preliminary





 Blood    No Growth after 24 hours














Assessment and Plan


Assessment: 





 1.  Recurrent abscesses.


2.  Intractable pain in the lower extremity secondary to the above.


3.  History of MRSA infection, recurrent.


4.  Anxiety and depression.


5.  Hypertension.


6.  Acute kidney injury likely secondary to dehydration.


 patient continued to be afebrile with normal white blood count evaluated by 

general surgery who recommended IND in the operating room given the necrotic 

tissues and ongoing drainage along with the abscess formation on the left 

posterior aspect of the thigh.  Patient is going today and was kept nothing by 

mouth overnight.  Patient still in custody.  Patient will be maintained on IV 

antibiotics and will follow up on final culture results as is showing possible 

staff.  Patient will be resumed on his psych medication and we would continue 

current pain management with Norco 10 mg every 4 hours when necessary.  

Prognosis remained guarded.

## 2022-01-31 VITALS
RESPIRATION RATE: 17 BRPM | DIASTOLIC BLOOD PRESSURE: 59 MMHG | SYSTOLIC BLOOD PRESSURE: 93 MMHG | HEART RATE: 56 BPM | TEMPERATURE: 98.2 F

## 2022-01-31 RX ADMIN — BACITRACIN SCH EACH: 500 OINTMENT TOPICAL at 10:44

## 2022-01-31 RX ADMIN — CEFAZOLIN SCH: 330 INJECTION, POWDER, FOR SOLUTION INTRAMUSCULAR; INTRAVENOUS at 01:41

## 2022-01-31 RX ADMIN — HYDROCODONE BITARTRATE AND ACETAMINOPHEN PRN EACH: 5; 325 TABLET ORAL at 05:53

## 2022-01-31 RX ADMIN — CEFAZOLIN SCH MLS/HR: 330 INJECTION, POWDER, FOR SOLUTION INTRAMUSCULAR; INTRAVENOUS at 02:48

## 2022-01-31 RX ADMIN — HYDROCODONE BITARTRATE AND ACETAMINOPHEN PRN EACH: 5; 325 TABLET ORAL at 10:44

## 2022-01-31 RX ADMIN — SODIUM CHLORIDE SCH MLS/HR: 9 INJECTION, SOLUTION INTRAVENOUS at 05:53

## 2022-02-01 NOTE — P.DS
Providers


Date of admission: 


01/28/22 17:58





Expected date of discharge: 01/31/22


Attending physician: 


Vin Davalos





Consults: 





                                        





01/28/22 17:57


Consult Physician Routine 


   Consulting Provider: Kaleigh Jeff


   Consult Reason/Comments: Abscess cellulitis


   Do you want consulting provider notified?: Yes





01/29/22 10:28


Consult Physician Routine 


   Consulting Provider: Alex Warren


   Consult Reason/Comments: right lower leg and left posterior thigh abscesses


   Do you want consulting provider notified?: Yes





01/29/22 13:10


Consult Physician Routine 


   Consulting Provider: [facility]


   Consult Reason/Comments: [facility]


   Do you want consulting provider notified?: Yes











Primary care physician: 


[facility] Course: 





HISTORY OF PRESENT ILLNESS


51-year-old male with past medical history is significant for recurrent 

abscesses formation status post multiple I&D presents to the emergency 

department with worsening pain in the bilateral lower extremity and draining 

abscesses.  According to the patient had multiple abscesses that starts as small

lesion on extremities gets larger in size and then starts draining pus most of 

them will subside on its own and some of them would continue to grow and 

requires I&D most recent was is in his left hand and that was drained by a 

surgeon and since that time he had some freezing and his ring finger and has not

been morning.  Patient had a history of multiple MRSA infection in the past 

denied fever or chills was brought from the present to the hospital for further 

evaluation after he was seen outpatient where he was treated with Rocephin and 

other oral antibiotics and seems to be feeling on outpatient management and 

presented to the emergency department area did patient takes Norco at home 5 mg 

every 4 hours which has been ineffective.  Patient denied any change in his 

habits stated that he still taking his medication as prescribed including all 

his psych medication and blood pressure medication.  Patient is denying current 

tobacco alcohol or drug abuse





1/31: Patient is seen and followed by Dr. Jeff from infectious disease with 

recommendations to continue vancomycin.  Wound culture is positive for MRSA .  

He is status post excision of necrotic right lower extremity and left posterior 

thigh abscesses, excision of 2 cm right medial thigh skin lesion on 1/30 by Dr. Iniguez.  Blood culture no growth after 48 hours.  Patient has been afebrile,

heart rate 54, blood pressure 93/58, pulse ox 96% on room air.  Creatinine from 

yesterday was 1.26.  And vancomycin trough 14.4.  On  has discussed 

discharge planning in because patient is to return to senior care at discharge, no IV 

antibiotics will be allowed outflow that he will be able to follow the wound 

healing Center.  Wound care would then be provided at that she'll.  Dr. Jeff 

has recommended a course of Bactrim.  Patient will be discharged today in stable

condition.











DISCHARGE DIAGNOSES


1.  Recurrent abscesses status post I&D 1/30.


2.  Intractable pain in the lower extremity secondary to the above.


3.  History of MRSA infection, recurrent.


4.  Generalized anxiety disorder and recurrent depression.


5.  Hypertension.


6.  Acute kidney injury likely secondary to dehydration.








DISCHARGE PLAN


Return to senior care.


Greater than 35 minutes was utilized and coordinating patient's discharge.


Impression and plan of care have been directed as dictated by the signing 

physician.  Madai Maldonado nurse practitioner acting as scribe for signing 

physician.





Patient Condition at Discharge: Good





Plan - Discharge Summary


Discharge Rx Participant: Yes


New Discharge Prescriptions: 


New


   Sulfamethox-Tmp 800-160Mg [Bactrim -160 mg] 1 tab PO Q12HR #20 tab





No Action


   QUEtiapine [SEROquel] 100 mg PO HS


   busPIRone HCL 15 mg PO BID


   Escitalopram [Lexapro] 20 mg PO DAILY


   cloNIDine HCL [Catapres] 0.1 mg PO HS


   Ciprofloxacin HCl [Cipro] 500 mg PO Q12HR


   cefTRIAXone [Rocephin] 1 gm IVPB Q24H


Discharge Medication List





Ciprofloxacin HCl [Cipro] 500 mg PO Q12HR 01/28/22 [History]


Escitalopram [Lexapro] 20 mg PO DAILY 01/28/22 [History]


QUEtiapine [SEROquel] 100 mg PO HS 01/28/22 [History]


busPIRone HCL 15 mg PO BID 01/28/22 [History]


cefTRIAXone [Rocephin] 1 gm IVPB Q24H 01/28/22 [History]


cloNIDine HCL [Catapres] 0.1 mg PO HS 01/28/22 [History]


Sulfamethox-Tmp 800-160Mg [Bactrim -160 mg] 1 tab PO Q12HR #20 tab 

01/31/22 [Rx]








Follow up Appointment(s)/Referral(s): 


[facility] [[facility] Care Provider] - 1-2 days


Patient Instructions/Handouts:  MRSA (Methicillin-Resistant Staphylococcus 

Aureus) (DC), Cellulitis (DC), Abscess (ED)


Discharge Disposition: HOME SELF-CARE